# Patient Record
Sex: MALE | Race: WHITE | Employment: OTHER | URBAN - METROPOLITAN AREA
[De-identification: names, ages, dates, MRNs, and addresses within clinical notes are randomized per-mention and may not be internally consistent; named-entity substitution may affect disease eponyms.]

---

## 2017-02-02 ENCOUNTER — TELEPHONE (OUTPATIENT)
Dept: FAMILY MEDICINE | Facility: CLINIC | Age: 72
End: 2017-02-02

## 2017-02-02 NOTE — Clinical Note
February 2, 2017      Tommie Kendrick  [address] [place] [id number]        Dear Mr. Tommie Kendrick,    Our records show that you are currently due for screening for colon cancer. If you would like to complete a colonoscopy please call [phone]. If you would like to complete the stool sample option (FIT Test) please call our clinic at [phone] and we would be happy to assist you.    If you have already had this completed please contact our clinic so we can update your chart.     If you have further questions or concerns please feel free to contact us via SupportSpace or by calling our clinic at [phone].    Thank you,  Jaime Crews [facility]

## 2017-02-10 DIAGNOSIS — I63.81 ACUTE THALAMIC INFARCTION (H): ICD-10-CM

## 2017-02-10 DIAGNOSIS — Z00.00 ENCOUNTER FOR ROUTINE ADULT HEALTH EXAMINATION WITHOUT ABNORMAL FINDINGS: ICD-10-CM

## 2017-02-10 LAB
ALBUMIN SERPL-MCNC: 3.7 G/DL (ref 3.4–5)
ALP SERPL-CCNC: 88 U/L (ref 40–150)
ALT SERPL W P-5'-P-CCNC: 34 U/L (ref 0–70)
ANION GAP SERPL CALCULATED.3IONS-SCNC: 5 MMOL/L (ref 3–14)
AST SERPL W P-5'-P-CCNC: 26 U/L (ref 0–45)
BILIRUB SERPL-MCNC: 1.6 MG/DL (ref 0.2–1.3)
BUN SERPL-MCNC: 15 MG/DL (ref 7–30)
CALCIUM SERPL-MCNC: 8.7 MG/DL (ref 8.5–10.1)
CHLORIDE SERPL-SCNC: 105 MMOL/L (ref 94–109)
CHOLEST SERPL-MCNC: 104 MG/DL
CO2 SERPL-SCNC: 30 MMOL/L (ref 20–32)
CREAT SERPL-MCNC: 1.11 MG/DL (ref 0.66–1.25)
GFR SERPL CREATININE-BSD FRML MDRD: 65 ML/MIN/1.7M2
GLUCOSE SERPL-MCNC: 102 MG/DL (ref 70–99)
HDLC SERPL-MCNC: 44 MG/DL
LDLC SERPL CALC-MCNC: 42 MG/DL
NONHDLC SERPL-MCNC: 60 MG/DL
POTASSIUM SERPL-SCNC: 4.3 MMOL/L (ref 3.4–5.3)
PROT SERPL-MCNC: 6.7 G/DL (ref 6.8–8.8)
SODIUM SERPL-SCNC: 140 MMOL/L (ref 133–144)
TRIGL SERPL-MCNC: 92 MG/DL

## 2017-02-10 PROCEDURE — 36415 COLL VENOUS BLD VENIPUNCTURE: CPT | Performed by: PHYSICIAN ASSISTANT

## 2017-02-10 PROCEDURE — 80053 COMPREHEN METABOLIC PANEL: CPT | Performed by: PHYSICIAN ASSISTANT

## 2017-02-10 PROCEDURE — 80061 LIPID PANEL: CPT | Performed by: PHYSICIAN ASSISTANT

## 2017-05-25 ENCOUNTER — TELEPHONE (OUTPATIENT)
Dept: FAMILY MEDICINE | Facility: CLINIC | Age: 72
End: 2017-05-25

## 2017-05-25 NOTE — TELEPHONE ENCOUNTER
Panel Management Review      Patient has the following on his problem list: None      Composite cancer screening  Chart review shows that this patient is due/due soon for the following Colonoscopy  Summary:    Patient is due/failing the following:   COLONOSCOPY    Action needed:   Patient needs office visit for Colonoscopy.    Type of outreach:    Phone, left message for patient to call back.     Questions for provider review:    None                                                                                                                                    SUBJECTIVE: Jaime Crews [facility]       Chart routed to Care Team .

## 2017-06-08 DIAGNOSIS — I63.81 ACUTE THALAMIC INFARCTION (H): ICD-10-CM

## 2017-06-08 NOTE — TELEPHONE ENCOUNTER
atorvastatin (LIPITOR) 40 MG     Last Written Prescription Date: 12/15/16  Last Fill Quantity: 90, # refills: 0  Last Office Visit with G, P or [facility] prescribing provider: 12/15/16       Lab Results   Component Value Date    CHOL 104 02/10/2017     Lab Results   Component Value Date    HDL 44 02/10/2017     Lab Results   Component Value Date    LDL 42 02/10/2017     Lab Results   Component Value Date    TRIG 92 02/10/2017     Lab Results   Component Value Date    CHOLHDLRATIO 3.9 05/12/2015

## 2017-06-12 RX ORDER — ATORVASTATIN CALCIUM 40 MG/1
TABLET, FILM COATED ORAL
Qty: 90 TABLET | Refills: 0 | Status: SHIPPED | OUTPATIENT
Start: 2017-06-12 | End: 2017-12-13

## 2017-06-12 NOTE — TELEPHONE ENCOUNTER
Prescription approved per [facility] Refill Protocol.    Masha Rachel RN

## 2017-07-25 ENCOUNTER — DOCUMENTATION ONLY (OUTPATIENT)
Dept: FAMILY MEDICINE | Facility: CLINIC | Age: 72
End: 2017-07-25

## 2017-07-25 NOTE — PROGRESS NOTES
Panel Management Review      Patient has the following on his problem list: None      Composite cancer screening  Chart review shows that this patient is due/due soon for the following Colonoscopy  Summary:    Patient is due/failing the following:   COLONOSCOPY    Action needed:   Patient needs office visit for Colonoscopy.    Type of outreach:    Phone, left message for patient to call back.     Questions for provider review:    None                                                                                                                                    Jaime Crews [facility]       Chart routed to Care Team .

## 2017-12-13 DIAGNOSIS — I63.81 ACUTE THALAMIC INFARCTION (H): ICD-10-CM

## 2017-12-14 RX ORDER — ATORVASTATIN CALCIUM 40 MG/1
TABLET, FILM COATED ORAL
Qty: 30 TABLET | Refills: 0 | Status: SHIPPED | OUTPATIENT
Start: 2017-12-14 | End: 2018-03-01

## 2017-12-14 NOTE — TELEPHONE ENCOUNTER
Pt due for office visit. Called to schedule. No answer, Left message advising callback.     2 month supply issued (30 tabs)    Valentina Martini RN -- [facility]

## 2017-12-14 NOTE — TELEPHONE ENCOUNTER
Requested Prescriptions   Pending Prescriptions Disp Refills     atorvastatin (LIPITOR) 40 MG tablet [Pharmacy Med Name: ATORVASTATIN 40 MG TABLET] 90 tablet 0     Sig: TAKE 0.5 TABLETS (20 MG) BY MOUTH DAILY    Statins Protocol Passed    12/13/2017  8:15 AM       Passed - LDL on file in past 12 months    Recent Labs   Lab Test  02/10/17   0810   LDL  42            Passed - No abnormal creatine kinase in past 12 months    No lab results found.         Passed - Recent or future visit with authorizing provider    Patient had office visit in the last year or has a visit in the next 30 days with authorizing provider.  See chart review.              Passed - Patient is age 18 or older

## 2017-12-29 ENCOUNTER — TELEPHONE (OUTPATIENT)
Dept: FAMILY MEDICINE | Facility: CLINIC | Age: 72
End: 2017-12-29

## 2017-12-29 NOTE — TELEPHONE ENCOUNTER
"12/29/2017        Patient Communication Preferences indicate  Do not contact  and/or communication by \"Phone\" is not preferred. Call not required per Outreach team.          Outreach ,  Yisel Barrios     "

## 2018-03-01 DIAGNOSIS — I63.81 ACUTE THALAMIC INFARCTION (H): ICD-10-CM

## 2018-03-01 RX ORDER — ATORVASTATIN CALCIUM 40 MG/1
TABLET, FILM COATED ORAL
Qty: 30 TABLET | Refills: 0 | Status: SHIPPED | OUTPATIENT
Start: 2018-03-01 | End: 2018-03-26

## 2018-03-01 NOTE — TELEPHONE ENCOUNTER
"Requested Prescriptions   Pending Prescriptions Disp Refills     atorvastatin (LIPITOR) 40 MG tablet [Pharmacy Med Name: ATORVASTATIN 40 MG TABLET]  Last Written Prescription Date:  12/14/17  Last Fill Quantity: 30,  # refills: 0   Last office visit: 12/15/2016 with prescribing provider:  12/15/2016     Future Office Visit:   Next 5 appointments (look out 90 days)     Mar 26, 2018  9:40 AM CDT   PHYSICAL with Christiano Resendiz PA-C   [address] [address] [phone]                  30 tablet 0     Sig: TAKE 0.5 TABLETS (20 MG) BY MOUTH DAILY    Statins Protocol Failed    3/1/2018 11:18 AM       Failed - LDL on file in past 12 months    Recent Labs   Lab Test  02/10/17   0810   LDL  42            Passed - No abnormal creatine kinase in past 12 months    No lab results found.         Passed - Recent or future visit with authorizing provider    Patient had office visit in the last year or has a visit in the next 30 days with authorizing provider.  See \"Patient Info\" tab in inbasket, or \"Choose Columns\" in Meds & Orders section of the refill encounter.            Passed - Patient is age 18 or older          "

## 2018-03-01 NOTE — TELEPHONE ENCOUNTER
Patient is coming in this month for a physical and labs.   Refilled x 1 month.  Stephanie Lui, RN  Triage Nurse

## 2018-03-26 ENCOUNTER — OFFICE VISIT (OUTPATIENT)
Dept: FAMILY MEDICINE | Facility: CLINIC | Age: 73
End: 2018-03-26
Payer: COMMERCIAL

## 2018-03-26 VITALS
HEIGHT: 66 IN | SYSTOLIC BLOOD PRESSURE: 139 MMHG | WEIGHT: 147.7 LBS | DIASTOLIC BLOOD PRESSURE: 69 MMHG | HEART RATE: 67 BPM | TEMPERATURE: 97.3 F | RESPIRATION RATE: 18 BRPM | OXYGEN SATURATION: 98 % | BODY MASS INDEX: 23.74 KG/M2

## 2018-03-26 DIAGNOSIS — I63.9 CEREBRAL INFARCTION, UNSPECIFIED MECHANISM (H): ICD-10-CM

## 2018-03-26 DIAGNOSIS — Z00.00 ENCOUNTER FOR ROUTINE ADULT HEALTH EXAMINATION WITHOUT ABNORMAL FINDINGS: Primary | ICD-10-CM

## 2018-03-26 DIAGNOSIS — E78.5 HYPERLIPIDEMIA LDL GOAL <70: ICD-10-CM

## 2018-03-26 DIAGNOSIS — I63.81 ACUTE THALAMIC INFARCTION (H): ICD-10-CM

## 2018-03-26 DIAGNOSIS — Z12.11 SCREEN FOR COLON CANCER: ICD-10-CM

## 2018-03-26 PROCEDURE — 36415 COLL VENOUS BLD VENIPUNCTURE: CPT | Performed by: PHYSICIAN ASSISTANT

## 2018-03-26 PROCEDURE — 80048 BASIC METABOLIC PNL TOTAL CA: CPT | Performed by: PHYSICIAN ASSISTANT

## 2018-03-26 PROCEDURE — 80061 LIPID PANEL: CPT | Performed by: PHYSICIAN ASSISTANT

## 2018-03-26 PROCEDURE — G0439 PPPS, SUBSEQ VISIT: HCPCS | Performed by: PHYSICIAN ASSISTANT

## 2018-03-26 RX ORDER — ATORVASTATIN CALCIUM 40 MG/1
20 TABLET, FILM COATED ORAL DAILY
Qty: 90 TABLET | Refills: 1 | Status: SHIPPED | OUTPATIENT
Start: 2018-03-26 | End: 2019-04-10

## 2018-03-26 ASSESSMENT — ENCOUNTER SYMPTOMS
FREQUENCY: 0
EYE PAIN: 0
ABDOMINAL PAIN: 0
CHILLS: 0
FEVER: 0
CONSTIPATION: 1
DIZZINESS: 0
HEMATURIA: 0
COUGH: 0
NERVOUS/ANXIOUS: 0
HEMATOCHEZIA: 0
DIARRHEA: 1

## 2018-03-26 ASSESSMENT — ACTIVITIES OF DAILY LIVING (ADL)
I_NEED_ASSISTANCE_FOR_THE_FOLLOWING_DAILY_ACTIVITIES:: NO ASSISTANCE IS NEEDED
CURRENT_FUNCTION: NO ASSISTANCE NEEDED

## 2018-03-26 NOTE — LETTER
March 27, 2018      Tommie Kendrick  [address] [facility] [address]        León Reilly,    Great seeing you again this week!  Attached are copies of your labs. Overall they look pretty good.    Screening of the kidneys, electrolytes and for diabetes looked within the expected ranges.  The sugar is right on that borderline but has been stable there for quite some time.  Continue the excellent focus on exercise.    Your cholesterol remains well controlled on the medication.    For now no changes are needed which is good. Please remember to get that colonoscopy done and consider the new shingles vaccine!  Let me know any questions,     Randy Resendiz

## 2018-03-26 NOTE — MR AVS SNAPSHOT
After Visit Summary   3/26/2018    Tommie Kendrick    MRN: [id number]           Patient Information     Date Of Birth          1945        Visit Information        Provider Department      3/26/2018 9:40 AM Christian[facility] Res[facility]iz [facility]        Today's Diagnoses     Encounter for routine adult health examination without abnormal findings    -  1    Screen for colon cancer        Hyperlipidemia LDL goal <70        Cerebral infarction, unspecified mechanism (H)        Acute thalamic infarction (H)          Care Instructions        Preventive Health Recommendations:       Male Ages 65 and over    Yearly exam:             See your health care provider every year in order to  o   Review health changes.   o   Discuss preventive care.    o   Review your medicines if your doctor has prescribed any.    Talk with your health care provider about whether you should have a test to screen for prostate cancer (PSA).    Every 3 years, have a diabetes test (fasting glucose). If you are at risk for diabetes, you should have this test more often.    Every 5 years, have a cholesterol test. Have this test more often if you are at risk for high cholesterol or heart disease.     Every 10 years, have a colonoscopy. Or, have a yearly FIT test (stool test). These exams will check for colon cancer.    Talk to with your health care provider about screening for Abdominal Aortic Aneurysm if you have a family history of AAA or have a history of smoking.  Shots:     Get a flu shot each year.     Get a tetanus shot every 10 years.     Talk to your doctor about your pneumonia vaccines. There are now two you should receive - Pneumovax (PPSV 23) and Prevnar (PCV 13).    Talk to your doctor about a shingles vaccine.     Talk to your doctor about the hepatitis B vaccine.  Nutrition:     Eat at least 5 servings of fruits and vegetables each day.     Eat whole-grain bread, whole-wheat pasta and brown rice  instead of white grains and rice.     Talk to your doctor about Calcium and Vitamin D.   Lifestyle    Exercise for at least 150 minutes a week (30 minutes a day, 5 days a week). This will help you control your weight and prevent disease.     Limit alcohol to one drink per day.     No smoking.     Wear sunscreen to prevent skin cancer.     See your dentist every six months for an exam and cleaning.     See your eye doctor every 1 to 2 years to screen for conditions such as glaucoma, macular degeneration and cataracts.          Follow-ups after your visit        Additional Services     GASTROENTEROLOGY ADULT REF PROCEDURE ONLY       Last Lab Result: Creatinine (mg/dL)       Date                     Value                 02/10/2017               1.11             ----------  Body mass index is 24.02 kg/(m^2).     Needed:  No  Language:  English    Patient will be contacted to schedule procedure.     Please be aware that coverage of these services is subject to the terms and limitations of your health insurance plan.  Call member services at your health plan with any benefit or coverage questions.  Any procedures must be performed at a [facility] facility OR coordinated by your clinic's referral office.    Please bring the following with you to your appointment:    (1) Any X-Rays, CTs or MRIs which have been performed.  Contact the facility where they were done to arrange for  prior to your scheduled appointment.    (2) List of current medications   (3) This referral request   (4) Any documents/labs given to you for this referral                  Who to contact     If you have questions or need follow up information about today's clinic visit or your schedule please contact [facility] directly at [phone].  Normal or non-critical lab and imaging results will be communicated to you by MyChart, letter or phone within 4 business days after the clinic has received the results. If you do  "not hear from us within 7 days, please contact the clinic through [employer] or phone. If you have a critical or abnormal lab result, we will notify you by phone as soon as possible.  Submit refill requests through [employer] or call your pharmacy and they will forward the refill request to us. Please allow 3 business days for your refill to be completed.          Additional Information About Your Visit        [employer] Information     [employer] lets you send messages to your doctor, view your test results, renew your prescriptions, schedule appointments and more. To sign up, go to [email or web address] . Click on \"Log in\" on the left side of the screen, which will take you to the Welcome page. Then click on \"Sign up Now\" on the right side of the page.     You will be asked to enter the access code listed below, as well as some personal information. Please follow the directions to create your username and password.     Your access code is: [email or web address]  Expires: 2018 10:24 AM     Your access code will  in 90 days. If you need help or a new code, please call your [facility] clinic or [phone].        Care EveryWhere ID     This is your Care EveryWhere ID. This could be used by other organizations to access your [facility] medical records  [id number]        Your Vitals Were     Pulse Temperature Respirations Height Pulse Oximetry BMI (Body Mass Index)    67 97.3  F (36.3  C) (Tympanic) 18 5' 5.75\" (1.67 m) 98% 24.02 kg/m2       Blood Pressure from Last 3 Encounters:   18 139/69   12/15/16 130/74   06/22/15 131/67    Weight from Last 3 Encounters:   18 147 lb 11.2 oz (67 kg)   12/15/16 153 lb 1.6 oz (69.4 kg)   06/22/15 158 lb (71.7 kg)              We Performed the Following     Basic metabolic panel     GASTROENTEROLOGY ADULT REF PROCEDURE ONLY     Lipid panel reflex to direct LDL Fasting          Today's Medication Changes          These changes are accurate as of 3/26/18 10:24 AM.  If you have any " questions, ask your nurse or doctor.               These medicines have changed or have updated prescriptions.        Dose/Directions    atorvastatin 40 MG tablet   Commonly known as:  LIPITOR   This may have changed:  See the new instructions.   Used for:  Acute thalamic infarction (H)   Changed by:  Christiano Resendiz PA-C        Dose:  20 mg   Take 0.5 tablets (20 mg) by mouth daily   Quantity:  90 tablet   Refills:  1            Where to get your medicines      These medications were sent to [place] [id number] [place] [facility] - [address] [facility] [address] [facility] [address]     Phone:  [phone]     atorvastatin 40 MG tablet                Primary Care Provider Office Phone # Fax #    Christiano Resendiz PA-C [phone] [address]  [facility] [address]        Equal Access to Services     [facility] : Hadii zoe ku hadasho Soomaali, waaxda luqadaha, qaybta kaalmada adeegyada, waxay seb reveles . So [facility] [phone].    ATENCIÓN: Si habla español, tiene a mooney disposición servicios gratuitos de asistencia lingüística. [facility] [phone].    We comply with applicable federal civil rights laws and [place] laws. We do not discriminate on the basis of race, color, national origin, age, disability, sex, sexual orientation, or gender identity.            Thank you!     Thank you for choosing [facility]  for your care. Our goal is always to provide you with excellent care. Hearing back from our patients is one way we can continue to improve our services. Please take a few minutes to complete the written survey that you may receive in the mail after your visit with us. Thank you!             Your Updated Medication List - Protect others around you: Learn how to safely use, store and throw away your medicines at www.disposemymeds.org.          This list is accurate as of 3/26/18 10:24 AM.  Always use your most recent med  list.                   Brand Name Dispense Instructions for use Diagnosis    ADVIL PO      Take 400 mg by mouth every 8 hours as needed.        atorvastatin 40 MG tablet    LIPITOR    90 tablet    Take 0.5 tablets (20 mg) by mouth daily    Acute thalamic infarction (H)       CALCIUM 1200+D3 600- MG-MG-UNIT Tb24   Generic drug:  Calcium-Magnesium-Vitamin D           VITAMIN D (CHOLECALCIFEROL) PO      Take 4,000 Units by mouth daily

## 2018-03-26 NOTE — PROGRESS NOTES
SUBJECTIVE:   Tommie Kendrick is a 72 year old male who presents for Preventive Visit.  Are you in the first 12 months of your Medicare coverage?  No    Physical   Annual:     Getting at least 3 servings of Calcium per day::  Yes    Bi-annual eye exam::  NO    Dental care twice a year::  NO    Sleep apnea or symptoms of sleep apnea::  None    Diet::  Regular (no restrictions)    Frequency of exercise::  1 day/week    Duration of exercise::  15-30 minutes    Taking medications regularly::  Yes    Medication side effects::  None    Additional concerns today::  YES (left shoulder )    Ability to successfully perform activities of daily living: no assistance needed  Home Safety:  No safety concerns identified  Hearing Impairment: no hearing concerns      Fall risk:  Fallen 2 or more times in the past year?: Yes  Any fall with injury in the past year?: No  COGNITIVE SCREEN  1) Repeat 3 items (Banana, Sunrise, Chair)    2) Clock draw: NORMAL  3) 3 item recall: Recalls 3 objects  Results: 3 items recalled: COGNITIVE IMPAIRMENT LESS LIKELY    Mini-CogTM Copyright TIANA Berry. Licensed by the author for use in [facility]; reprinted with permission (soob@[facility]). All rights reserved.        Reviewed and updated as needed this visit by clinical staff         Reviewed and updated as needed this visit by Provider        Social History   Substance Use Topics     Smoking status: Former Smoker     Years: 8.00     Quit date: 10/28/1980     Smokeless tobacco: Not on file      Comment: 2 ppweek     Alcohol use 0.0 - 0.5 oz/week     0 - 1 Cans of beer per week      Comment: once a week, 2 drinks       Alcohol Use 3/26/2018   If you drink alcohol do you typically have greater than 3 drinks per day OR greater than 7 drinks per week? No     Today's PHQ-2 Score:   PHQ-2 ( 1999 Pfizer) 3/26/2018   Q1: Little interest or pleasure in doing things 0   Q2: Feeling down, depressed or hopeless 0   PHQ-2 Score 0   Q1: Little interest  or pleasure in doing things Not at all   Q2: Feeling down, depressed or hopeless Not at all   PHQ-2 Score 0       Do you feel safe in your environment - Yes    Do you have a Health Care Directive?: Yes: Patient states has Advance Directive and will bring in a copy to clinic.    Current providers sharing in care for this patient include:   Patient Care Team:  Christiano Resendiz PA-C as PCP - General (Physician Assistant)    The following health maintenance items are reviewed in Epic and correct as of today:  Health Maintenance   Topic Date Due     ADVANCE DIRECTIVE PLANNING Q5 YRS  07/20/1999     COLON CANCER SCREEN (SYSTEM ASSIGNED)  06/03/2014     FALL RISK ASSESSMENT  12/15/2017     INFLUENZA VACCINE (SYSTEM ASSIGNED)  09/01/2018     LIPID SCREEN Q5 YR MALE (SYSTEM ASSIGNED)  02/10/2022     TETANUS IMMUNIZATION (SYSTEM ASSIGNED)  12/15/2026     PNEUMOCOCCAL  Completed     AORTIC ANEURYSM SCREENING (SYSTEM ASSIGNED)  Completed     Labs reviewed in EPIC    Pneumonia Vaccine: Up to date    Review of Systems   Constitutional: Negative for chills and fever.   HENT: Negative for congestion and ear pain.    Eyes: Negative for pain.   Respiratory: Negative for cough.    Cardiovascular: Negative for chest pain.   Gastrointestinal: Positive for constipation (periodic, not problematic) and diarrhea (periodic, not problematic). Negative for abdominal pain and hematochezia.   Genitourinary: Negative for frequency, genital sores and hematuria.   Musculoskeletal: Arthralgias: left shoulder pain, limited ROM.   Neurological: Negative for dizziness.   Psychiatric/Behavioral: The patient is not nervous/anxious.      Patient also notes some left shoulder ache/pain and limited ROM  He denies any specific trauma; occurred after watching his grandkids one day  Initially had difficulty moving past 90 degrees and laying on the arm   -ROM steadily improving, difficulty laying on it still  No n/t   He is limited with lifting heavier  "items, but no other limitation  He would like to wait on working this up - will recheck in 6 months      OBJECTIVE:   /69 (BP Location: Right arm, Patient Position: Chair, Cuff Size: Adult Regular)  Pulse 67  Temp 97.3  F (36.3  C) (Tympanic)  Resp 18  Ht 5' 5.75\" (1.67 m)  Wt 147 lb 11.2 oz (67 kg)  SpO2 98%  BMI 24.02 kg/m2 Estimated body mass index is 24.52 kg/(m^2) as calculated from the following:    Height as of 12/15/16: 5' 6.25\" (1.683 m).    Weight as of 12/15/16: 153 lb 1.6 oz (69.4 kg).  Physical Exam  GENERAL: healthy, alert and no distress  EYES: Eyes grossly normal to inspection, PERRL and conjunctivae and sclerae normal  HENT: ear canals and TM's normal, nose and mouth without ulcers or lesions  NECK: no adenopathy, no asymmetry, masses, or scars and thyroid normal to palpation  RESP: lungs clear to auscultation - no rales, rhonchi or wheezes  CV: regular rate and rhythm, normal S1 S2, no S3 or S4, no murmur, click or rub, no peripheral edema and peripheral pulses strong  ABDOMEN: soft, nontender, no hepatosplenomegaly, no masses and bowel sounds normal  MS: left shoulder shows limitation with action above 90 but without gross weakness. Internal and external rotation wnl.   SKIN: no suspicious lesions or rashes  NEURO: Normal strength and tone, mentation intact and speech normal    ASSESSMENT / PLAN:   1. Encounter for routine adult health examination without abnormal findings  - Basic metabolic panel    2. Screen for colon cancer  Reviewed again today. As I stated previously, I think screening is appropriate in this patient given his health, life expectancy and willingness to treat if anything found  - GASTROENTEROLOGY ADULT REF PROCEDURE ONLY    3. Hyperlipidemia LDL goal <70  Tolerates statin well. Active. Monitoring diet.   - Lipid panel reflex to direct LDL Fasting    4. Cerebral infarction, unspecified mechanism (H)  5. Acute thalamic infarction (H)  No further symptoms. Continues " "healthy diet and quite active.   - Basic metabolic panel  - atorvastatin (LIPITOR) 40 MG tablet; Take 0.5 tablets (20 mg) by mouth daily  Dispense: 90 tablet; Refill: 1    End of Life Planning:  Patient currently has an advanced directive: Yes.  Practitioner is supportive of decision.    COUNSELING:  Reviewed preventive health counseling, as reflected in patient instructions       Regular exercise       Healthy diet/nutrition       Colon cancer screening       Prostate cancer screening    BP Screening:   Last 3 BP Readings:    BP Readings from Last 3 Encounters:   03/26/18 139/69   12/15/16 130/74   06/22/15 131/67       The following was recommended to the patient:  Re-screen BP within a year and recommended lifestyle modifications    Estimated body mass index is 24.52 kg/(m^2) as calculated from the following:    Height as of 12/15/16: 5' 6.25\" (1.683 m).    Weight as of 12/15/16: 153 lb 1.6 oz (69.4 kg).     reports that he quit smoking about 37 years ago. He quit after 8.00 years of use. He does not have any smokeless tobacco history on file.      Appropriate preventive services were discussed with this patient, including applicable screening as appropriate for cardiovascular disease, diabetes, osteopenia/osteoporosis, and glaucoma.  As appropriate for age/gender, discussed screening for colorectal cancer, prostate cancer, breast cancer, and cervical cancer. Checklist reviewing preventive services available has been given to the patient.    Reviewed patients plan of care and provided an AVS. The Basic Care Plan (routine screening as documented in Health Maintenance) for Tommie meets the Care Plan requirement. This Care Plan has been established and reviewed with the Patient.    Counseling Resources:  ATP IV Guidelines  Pooled Cohorts Equation Calculator  Breast Cancer Risk Calculator  FRAX Risk Assessment  ICSI Preventive Guidelines  Dietary Guidelines for Americans, 2010  USDA's MyPlate  ASA Prophylaxis  Lung " CA Screening    Christiano Resendiz PA-C  [facility]  Answers for HPI/ROS submitted by the patient on 3/26/2018   PHQ-2 Score: 0

## 2018-03-26 NOTE — PATIENT INSTRUCTIONS
Preventive Health Recommendations:       Male Ages 65 and over    Yearly exam:             See your health care provider every year in order to  o   Review health changes.   o   Discuss preventive care.    o   Review your medicines if your doctor has prescribed any.    Talk with your health care provider about whether you should have a test to screen for prostate cancer (PSA).    Every 3 years, have a diabetes test (fasting glucose). If you are at risk for diabetes, you should have this test more often.    Every 5 years, have a cholesterol test. Have this test more often if you are at risk for high cholesterol or heart disease.     Every 10 years, have a colonoscopy. Or, have a yearly FIT test (stool test). These exams will check for colon cancer.    Talk to with your health care provider about screening for Abdominal Aortic Aneurysm if you have a family history of AAA or have a history of smoking.  Shots:     Get a flu shot each year.     Get a tetanus shot every 10 years.     Talk to your doctor about your pneumonia vaccines. There are now two you should receive - Pneumovax (PPSV 23) and Prevnar (PCV 13).    Talk to your doctor about a shingles vaccine.     Talk to your doctor about the hepatitis B vaccine.  Nutrition:     Eat at least 5 servings of fruits and vegetables each day.     Eat whole-grain bread, whole-wheat pasta and brown rice instead of white grains and rice.     Talk to your doctor about Calcium and Vitamin D.   Lifestyle    Exercise for at least 150 minutes a week (30 minutes a day, 5 days a week). This will help you control your weight and prevent disease.     Limit alcohol to one drink per day.     No smoking.     Wear sunscreen to prevent skin cancer.     See your dentist every six months for an exam and cleaning.     See your eye doctor every 1 to 2 years to screen for conditions such as glaucoma, macular degeneration and cataracts.

## 2018-03-27 LAB
ANION GAP SERPL CALCULATED.3IONS-SCNC: 4 MMOL/L (ref 3–14)
BUN SERPL-MCNC: 17 MG/DL (ref 7–30)
CALCIUM SERPL-MCNC: 8.8 MG/DL (ref 8.5–10.1)
CHLORIDE SERPL-SCNC: 106 MMOL/L (ref 94–109)
CHOLEST SERPL-MCNC: 112 MG/DL
CO2 SERPL-SCNC: 29 MMOL/L (ref 20–32)
CREAT SERPL-MCNC: 1.16 MG/DL (ref 0.66–1.25)
GFR SERPL CREATININE-BSD FRML MDRD: 62 ML/MIN/1.7M2
GLUCOSE SERPL-MCNC: 100 MG/DL (ref 70–99)
HDLC SERPL-MCNC: 50 MG/DL
LDLC SERPL CALC-MCNC: 43 MG/DL
NONHDLC SERPL-MCNC: 62 MG/DL
POTASSIUM SERPL-SCNC: 4.8 MMOL/L (ref 3.4–5.3)
SODIUM SERPL-SCNC: 139 MMOL/L (ref 133–144)
TRIGL SERPL-MCNC: 93 MG/DL

## 2018-04-12 ENCOUNTER — TELEPHONE (OUTPATIENT)
Dept: FAMILY MEDICINE | Facility: CLINIC | Age: 73
End: 2018-04-12

## 2018-04-12 ENCOUNTER — HOSPITAL ENCOUNTER (OUTPATIENT)
Facility: CLINIC | Age: 73
Discharge: HOME | End: 2018-04-12
Attending: INTERNAL MEDICINE | Admitting: INTERNAL MEDICINE
Payer: COMMERCIAL

## 2018-04-12 VITALS
DIASTOLIC BLOOD PRESSURE: 75 MMHG | SYSTOLIC BLOOD PRESSURE: 117 MMHG | OXYGEN SATURATION: 95 % | RESPIRATION RATE: 15 BRPM

## 2018-04-12 LAB — COLONOSCOPY: NORMAL

## 2018-04-12 PROCEDURE — G0500 MOD SEDAT ENDO SERVICE >5YRS: HCPCS | Performed by: INTERNAL MEDICINE

## 2018-04-12 PROCEDURE — 45378 DIAGNOSTIC COLONOSCOPY: CPT | Performed by: INTERNAL MEDICINE

## 2018-04-12 PROCEDURE — G0121 COLON CA SCRN NOT HI RSK IND: HCPCS | Performed by: INTERNAL MEDICINE

## 2018-04-12 PROCEDURE — 25000128 H RX IP 250 OP 636: Performed by: INTERNAL MEDICINE

## 2018-04-12 RX ORDER — ONDANSETRON 2 MG/ML
4 INJECTION INTRAMUSCULAR; INTRAVENOUS
Status: DISCONTINUED | OUTPATIENT
Start: 2018-04-12 | End: 2018-04-12 | Stop reason: HOSPADM

## 2018-04-12 RX ORDER — FLUMAZENIL 0.1 MG/ML
0.2 INJECTION, SOLUTION INTRAVENOUS
Status: DISCONTINUED | OUTPATIENT
Start: 2018-04-12 | End: 2018-04-12 | Stop reason: HOSPADM

## 2018-04-12 RX ORDER — LIDOCAINE 40 MG/G
CREAM TOPICAL
Status: DISCONTINUED | OUTPATIENT
Start: 2018-04-12 | End: 2018-04-12 | Stop reason: HOSPADM

## 2018-04-12 RX ORDER — NALOXONE HYDROCHLORIDE 0.4 MG/ML
.1-.4 INJECTION, SOLUTION INTRAMUSCULAR; INTRAVENOUS; SUBCUTANEOUS
Status: DISCONTINUED | OUTPATIENT
Start: 2018-04-12 | End: 2018-04-12 | Stop reason: HOSPADM

## 2018-04-12 RX ORDER — ONDANSETRON 2 MG/ML
4 INJECTION INTRAMUSCULAR; INTRAVENOUS EVERY 6 HOURS PRN
Status: DISCONTINUED | OUTPATIENT
Start: 2018-04-12 | End: 2018-04-12 | Stop reason: HOSPADM

## 2018-04-12 RX ORDER — ONDANSETRON 4 MG/1
4 TABLET, ORALLY DISINTEGRATING ORAL EVERY 6 HOURS PRN
Status: DISCONTINUED | OUTPATIENT
Start: 2018-04-12 | End: 2018-04-12 | Stop reason: HOSPADM

## 2018-04-12 RX ORDER — FENTANYL CITRATE 50 UG/ML
INJECTION, SOLUTION INTRAMUSCULAR; INTRAVENOUS PRN
Status: DISCONTINUED | OUTPATIENT
Start: 2018-04-12 | End: 2018-04-12 | Stop reason: HOSPADM

## 2018-04-12 NOTE — H&P
Pre-Endoscopy History and Physical     Tommie Kendrick MRN# [id number]   YOB: 1945 Age: 72 year old     Date of Procedure: 4/12/2018  Primary care provider: Christiano Resendiz  Type of Endoscopy: Colonoscopy with possible biopsy, possible polypectomy  Reason for Procedure: screen  Type of Anesthesia Anticipated: Conscious Sedation    HPI:    Tommie is a 72 year old male who will be undergoing the above procedure.      A history and physical has been performed. The patient's medications and allergies have been reviewed. The risks and benefits of the procedure and the sedation options and risks were discussed with the patient.  All questions were answered and informed consent was obtained.      He denies a personal or family history of anesthesia complications or bleeding disorders.     Patient Active Problem List   Diagnosis     Degeneration of lumbar or lumbosacral intervertebral disc     Thoracic or lumbosacral neuritis or radiculitis, unspecified     ASD (atrial septal defect)     Choledocholithiasis     S/P laparoscopic cholecystectomy     Degenerative spondylolisthesis     Cerebral infarction (H)     Cerebral infarction due to unspecified mechanism     Hyperlipidemia LDL goal <70        Past Medical History:   Diagnosis Date     ASD (atrial septal defect), sinus venosus defect      Stroke (H)     thalamic        Past Surgical History:   Procedure Laterality Date     C NONSPECIFIC PROCEDURE  1997    right inguinal hernia repair      C REPR ASD W BYPASS  2004     COLONOSCOPY  3/04    good     ENDOSCOPIC RETROGRADE CHOLANGIOPANCREATOGRAM  12/17/2012    Procedure: COMBINED ENDOSCOPIC RETROGRADE CHOLANGIOPANCREATOGRAPHY, SPHINCTEROTOMY;  ercp;  Surgeon: Keri Sandoval MD;  Location:  GI     ENDOSCOPIC RETROGRADE CHOLANGIOPANCREATOGRAM  12/17/2012    Procedure: COMBINED ENDOSCOPIC RETROGRADE CHOLANGIOPANCREATOGRAPHY, REMOVE STONE/BALLOON;;  Surgeon: Keri Sandoval MD;   "Location: [facility]     HC PRESSURE GRADIENT MEASUREMENT, INITIAL VESSEL      good     HC REMOVAL OF TONSILS,<11 Y/O      Tonsils <11y.o.     LAPAROSCOPIC CHOLECYSTECTOMY WITH CHOLANGIOGRAMS  2012    Procedure: LAPAROSCOPIC CHOLECYSTECTOMY WITH CHOLANGIOGRAMS;  Laparoscopic cholecystectomy with grams;  Surgeon: Sharda Reilly DO;  Location:  OR       Social History   Substance Use Topics     Smoking status: Former Smoker     Years: 8.00     Quit date: 10/28/1980     Smokeless tobacco: Never Used      Comment: 2 ppweek     Alcohol use 0.0 - 0.5 oz/week     0 - 1 Cans of beer per week      Comment: occa       Family History   Problem Relation Age of Onset     Alzheimer Disease Mother       at 72yoa     DIABETES Father      DIABETES Paternal Grandmother      CANCER Maternal Aunt       of unknown type of cancer     C.A.D. No family hx of      Hypertension No family hx of      CEREBROVASCULAR DISEASE No family hx of      Colon Cancer No family hx of        Prior to Admission medications    Medication Sig Start Date End Date Taking? Authorizing Provider   atorvastatin (LIPITOR) 40 MG tablet Take 0.5 tablets (20 mg) by mouth daily 3/26/18  Yes Christiano Resendiz PA-C   Calcium-Magnesium-Vitamin D (CALCIUM 1200+D3) 600- MG-MG-UNIT TB24  6/12/15  Yes Cierra Irwin MD   Ibuprofen (ADVIL PO) Take 400 mg by mouth every 8 hours as needed.   Yes Unknown, Entered By History   VITAMIN D, CHOLECALCIFEROL, PO Take 4,000 Units by mouth daily    Reported, Patient       Allergies   Allergen Reactions     Penicillins Swelling     SWELLING OF ARM        REVIEW OF SYSTEMS:   5 point ROS negative except as noted above in HPI, including Gen., Resp., CV, GI &  system review.    PHYSICAL EXAM:   /75  Resp 14  SpO2 97% Estimated body mass index is 24.02 kg/(m^2) as calculated from the following:    Height as of 3/26/18: 1.67 m (5' 5.75\").    Weight as of 3/26/18: 67 kg (147 lb 11.2 oz).   GENERAL " APPEARANCE: alert, and oriented  MENTAL STATUS: alert  AIRWAY EXAM: Mallampatti Class I (visualization of the soft palate, fauces, uvula, anterior and posterior pillars)  RESP: lungs clear to auscultation - no rales, rhonchi or wheezes  CV: regular rates and rhythm  DIAGNOSTICS:    Not indicated    IMPRESSION   ASA Class 2 - Mild systemic disease    PLAN:   Plan for Colonoscopy with possible biopsy, possible polypectomy. We discussed the risks, benefits and alternatives and the patient wished to proceed.    The above has been forwarded to the consulting provider.      Signed Electronically by: Gregory Santos  April 12, 2018

## 2018-04-12 NOTE — LETTER
April 3, 2018      Tommie Kendrick  [address] [facility] [address]        Dear Tommie,         Thank you for choosing [facility]. You are scheduled for the following service.     Date:  April 12, 2018 - Thursday             Procedure:  COLONOSCOPY  Doctor:        Gregory Santos   Arrival Time:  11:30 am  *check in at Emergency/Endoscopy desk*  Procedure Time:  12:00 pm    Location:   [facility]        Endoscopy Department, First Floor (Enter through ER Doors) *        [address] [place], [place] [address]      [phone] or [phone] () to reschedule      MIRALAX -GATORADE  PREP  Colonoscopy is the most accurate test to detect colon polyps and colon cancer; and the only test where polyps can be removed. During this procedure, a doctor examines the lining of your large intestine and rectum through a flexible tube.           Transportation  Arrange for a ride for the day of your procedure with a responsible adult.  A taxi ride is not an option unless you are accompanied by a responsible adult. If you fail to arrange transportation with a responsible adult, your procedure will be cancelled and rescheduled.    Purchase the  following supplies at your local pharmacy:  - 2 (two) bisacodyl tablets: each tablet contains 5 mg.  (Dulcolax  laxative NOT Dulcolax  stool softener)   - 1 (one) 8.3 oz bottle of Polyethylene Glycol (PEG) 3350 Powder   (MiraLAX , Smooth LAX , ClearLAX  or equivalent)  - 64 oz Gatorade    Regular Gatorade, Gatorade G2 , Powerade , Powerade Zero  or Pedialyte  is acceptable. Red colored flavors are not allowed; all other colors (yellow, green, orange, purple and blue) are okay. It is also okay to buy two 2.12 oz packets of powdered Gatorade that can be mixed with water to a total volume of 64 oz of liquid.  - 1 (one) 10 oz bottle of Magnesium Citrate (Red colored flavors are not allowed)  It is also okay for you to use  a 0.5 oz package of powdered magnesium citrate (17 g) mixed with 10 oz of water.    PREPARATION FOR COLONOSCOPY    7 days before:    Discontinue fiber supplements and medications containing iron. This includes Metamucil  and Fibercon ; and multivitamins with iron.  3 days before:    Begin a low-fiber diet. A low-fiber diet helps making the cleanout more effective.     Examples of a low-fiber diet include (but are not limited to): white bread, white rice, pasta, crackers, fish, chicken, eggs, ground beef, creamy peanut butter, cooked/steamed/boiled vegetables, canned fruit, bananas, melons, milk, plain yogurt cheese, salad dressing and other condiments.     The following are not allowed on a low-fiber diet: seeds, nuts, popcorn, bran, whole wheat, corn, quinoa, raw fruits and vegetables, berries and dried fruit, beans and lentils.    For additional details on low-fiber diet, please refer to the table on the last page.  2 days before:    Continue the low-fiber diet.     Drink at least 8 glasses of water throughout the day.     Stop eating solid foods at 11:45 pm.  1 day before:    In the morning: begin a clear liquid diet (liquids you can see through).     Examples of a clear liquid diet include: water, clear broth or bouillon, Gatorade, Pedialyte or Powerade, carbonated and non-carbonated soft drinks (Sprite , 7-Up , ginger ale), strained fruit juices without pulp (apple, white grape, white cranberry), Jell-O  and popsicles.     The following are not allowed on a clear liquid diet: red liquids, alcoholic beverages, coffee, dairy products (milk, creamer, and yogurt), protein shakes, creamy broths, juice with pulp and chewing tobacco.    At noon: take 2 (two) bisacodyl tablets     At 4 (and no later than 6pm): start drinking the Miralax-Gatorade preparation (8.3 oz of Miralax mixed with 64 oz of Gatorade in a large pitcher). Drink 1(one) 8 oz glass every 15 minutes thereafter, until the mixture is gone.    COLON  CLEANSING TIPS: drink adequate amounts of fluids before and after your colon cleansing to prevent dehydration. Stay near a toilet because you will have diarrhea. Even if you are sitting on the toilet, continue to drink the cleansing solution every 15 minutes. If you feel nauseous or vomit, rinse your mouth with water, take a 15 to 30-minute-break and then continue drinking the solution. You will be uncomfortable until the stool has flushed from your colon (in about 2 to 4 hours). You may feel chilled.              Day of your procedure  You may take all of your morning medications including blood pressure medications, blood thinners (if you have not been instructed to stop these by our office), methadone, anti-seizure medications with sips of water 3 hours prior to your procedure or earlier. Do not take insulin or vitamins prior to your procedure. Continue the clear liquid diet.   4 hours prior: drink 10 oz of magnesium citrate. It may be easier to drink it with a straw.    STOP consuming all liquids after that.     Do not take anything by mouth during this time.     Allow extra time to travel to your procedure as you may need to stop and use a restroom along the way.  You are ready for the procedure, if you followed all instructions and your stool is no longer formed, but clear or yellow liquid. If you are unsure whether your colon is clean, please call our office at [phone] before you leave for your appointment.  Bring the following to your procedure:  - Insurance Card/Photo ID.   - List of current medications including over-the-counter medications and supplements.   - Your rescue inhaler if you currently use one to control asthma.      Canceling or rescheduling your appointment:   If you must cancel or reschedule your appointment, please call [phone] as soon as possible.      COLONOSCOPY PRE-PROCEDURE CHECKLIST  If you have diabetes, ask your regular doctor for diet and medication restrictions.  If you  take an anticoagulant or anti-platelet medication (such as Coumadin , Lovenox , Pradaxa , Xarelto , Eliquis , etc.), please call your primary doctor for advice on holding this medication.  If you take aspirin you may continue to do so.  If you are or may be pregnant, please discuss the risks and benefits of this procedure with your doctor.          What happens during a colonoscopy?    Plan to spend up to two hours, starting at registration time, at the endoscopy center the day of your procedure. The colonoscopy takes an average of 15 to 30 minutes. Recovery time is about 30 minutes.    Before the exam:    You will change into a gown.    Your medical history and medication list will be reviewed with you, unless that has been done over the phone prior to the procedure.     A nurse will insert an intravenous (IV) line into your hand or arm.    The doctor will meet with you and will give you a consent form to sign.    During the exam:     Medicine will be given through the IV line to help you relax.     Your heart rate and oxygen levels will be monitored. If your blood pressure is low, you may be given fluids through the IV line.     The doctor will insert a flexible hollow tube, called a colonoscope, into your rectum. The scope will be advanced slowly through the large intestine (colon).    You may have a feeling of fullness or pressure.     If an abnormal tissue or a polyp is found, the doctor may remove it through the endoscope for closer examination, or biopsy. Tissue removal is painless    After the exam:           Any tissue samples removed during the exam will be sent to a lab for evaluation. It may take 5-7 working days for you to be notified of the results.     A nurse will provide you with complete discharge instructions before you leave the endoscopy center. Be sure to ask the nurse for specific instructions if you take blood thinners such as Aspirin, Coumadin or Plavix.     The doctor will prepare a full  report for you and for the physician who referred you for the procedure.     Your doctor will talk with you about the initial results of your exam.      Medication given during the exam will prohibit you from driving for the rest of the day.     Following the exam, you may resume your normal diet. Your first meal should be light, no greasy foods. Avoid alcohol until the next day.     You may resume your regular activities the day after the procedure.     LOW-FIBER DIET    Foods RECOMMENDED Foods to AVOID   Breads, Cereal, Rice and Pasta:   White bread, rolls, biscuits, croissant and dedra toast.   Waffles, Luxembourger toast and pancakes.   White rice, noodles, pasta, macaroni and peeled cooked potatoes.   Plain crackers and saltines.   Cooked cereals: farina, cream of rice.   Cold cereals: Puffed Rice , Rice Krispies , Corn Flakes  and Special K    Breads, Cereal, Rice and Pasta:   Breads or rolls with nuts, seeds or fruit.   Whole wheat, pumpernickel, rye breads and cornbread.   Potatoes with skin, brown or wild rice, and kasha (buckwheat).     Vegetables:   Tender cooked and canned vegetables without seeds: carrots, asparagus tips, green or wax beans, pumpkin, spinach, lima beans. Vegetables:   Raw or steamed vegetables.   Vegetables with seeds.   Sauerkraut.   Winter squash, peas, broccoli, Brussel sprouts, cabbage, onions, cauliflower, baked beans, peas and corn.   Fruits:   Strained fruit juice.   Canned fruit, except pineapple.   Ripe bananas and melon. Fruits:   Prunes and prune juice.   Raw fruits.   Dried fruits: figs, dates and raisins.   Milk/Dairy:   Milk: plain or flavored.   Yogurt, custard and ice cream.   Cheese and cottage cheese Milk/Dairy:     Meat and other proteins:   ground, well-cooked tender beef, lamb, ham, veal, pork, fish, poultry and organ meats.   Eggs.   Peanut butter without nuts. Meat and other proteins:   Tough, fibrous meats with gristle.   Dry beans, peas and lentils.   Peanut butter  with nuts.   Tofu.   Fats, Snack, Sweets, Condiments and Beverages:   Margarine, butter, oils, mayonnaise, sour cream and salad dressing, plain gravy.   Sugar, hard candy, clear jelly, honey and syrup.   Spices, cooked herbs, bouillon, broth and soups made with allowed vegetable, ketchup and mustard.   Coffee, tea and carbonated drinks.   Plain cakes, cookies and pretzels.   Gelatin, plain puddings, custard, ice cream, sherbet and popsicles. Fats, Snack, Sweets, Condiments and Beverages:   Nuts, seeds and coconut.   Jam, marmalade and preserves.   Pickles, olives, relish and horseradish.   All desserts containing nuts, seeds, dried fruit and coconut; or made from whole grains or bran.   Candy made with nuts or seeds.   Popcorn.                     DIRECTIONS TO THE ENDOSCOPY DEPARTMENT     From the north ([facility])  Take [address] South, exit on [address] [address]. Get into the left hand teodora, turn left (east), go one-half mile to [address] and turn left. Go north to the first stoplight, take a right on [address] and follow it to the Emergency entrance.    From the south ([facility])  Take 35N to the [address]E split and exit on [address] [address]. On [address] [address], turn left (west) to [address]. Turn right (north) on [address]. Go north to the first stoplight, take a right on [address] and follow it to the Emergency entrance.    From the east via 35E ([facility])  Take [address]E south to [address] [address] exit. Turn right on [address] [address]. Go west to [address]. Turn right (north) on [address]. Go to the first stoplight, take a right and follow on [address] to the Emergency entrance.    From the east via [address] ([facility])  Take [address] to [address]. Turn left (south) on [address] to [address]. Turn left (east) on [address] and follow it to the Emergency entrance.    From the west via [address] ([place])  Take [address] east to [address]. Turn right (south) on [address] to [address]. Turn left (east) on [address] and follow it to the Emergency entrance.

## 2018-04-12 NOTE — TELEPHONE ENCOUNTER
Patient informed pharmacy does have prescription, but cannot fill until the 18th.    Patient states that is fine.    Lin Daniel RN

## 2018-04-12 NOTE — TELEPHONE ENCOUNTER
Patient states that [facility] did not receive Rx for atorvastatin (LIPITOR) 40 MG tablet.      Please resend Rx to pharmacy.     Thank you.

## 2018-04-12 NOTE — DISCHARGE INSTRUCTIONS
The patient has received a copy of the Provation  report the doctor has written and discharge instructions have been discussed with the patient and responsible adult.  All questions were addressed and answered prior to patient discharge.

## 2018-08-20 ENCOUNTER — OFFICE VISIT (OUTPATIENT)
Dept: FAMILY MEDICINE | Facility: CLINIC | Age: 73
End: 2018-08-20
Payer: COMMERCIAL

## 2018-08-20 VITALS
BODY MASS INDEX: 22.7 KG/M2 | RESPIRATION RATE: 14 BRPM | HEIGHT: 67 IN | WEIGHT: 144.6 LBS | DIASTOLIC BLOOD PRESSURE: 72 MMHG | SYSTOLIC BLOOD PRESSURE: 144 MMHG | HEART RATE: 67 BPM | OXYGEN SATURATION: 97 % | TEMPERATURE: 97.8 F

## 2018-08-20 DIAGNOSIS — I63.9 CEREBRAL INFARCTION, UNSPECIFIED MECHANISM (H): ICD-10-CM

## 2018-08-20 DIAGNOSIS — R25.1 TREMOR: ICD-10-CM

## 2018-08-20 DIAGNOSIS — H53.9 VISION CHANGES: ICD-10-CM

## 2018-08-20 DIAGNOSIS — R03.0 ELEVATED BLOOD PRESSURE READING IN OFFICE WITHOUT DIAGNOSIS OF HYPERTENSION: ICD-10-CM

## 2018-08-20 DIAGNOSIS — N50.89 TESTICULAR NODULE: Primary | ICD-10-CM

## 2018-08-20 LAB
ALBUMIN UR-MCNC: NEGATIVE MG/DL
APPEARANCE UR: CLEAR
BILIRUB UR QL STRIP: NEGATIVE
COLOR UR AUTO: YELLOW
GLUCOSE UR STRIP-MCNC: NEGATIVE MG/DL
HGB UR QL STRIP: NEGATIVE
KETONES UR STRIP-MCNC: NEGATIVE MG/DL
LEUKOCYTE ESTERASE UR QL STRIP: NEGATIVE
NITRATE UR QL: NEGATIVE
PH UR STRIP: 5.5 PH (ref 5–7)
SOURCE: NORMAL
SP GR UR STRIP: 1.02 (ref 1–1.03)
UROBILINOGEN UR STRIP-ACNC: 0.2 EU/DL (ref 0.2–1)

## 2018-08-20 PROCEDURE — 99214 OFFICE O/P EST MOD 30 MIN: CPT | Performed by: PHYSICIAN ASSISTANT

## 2018-08-20 PROCEDURE — 81003 URINALYSIS AUTO W/O SCOPE: CPT | Performed by: PHYSICIAN ASSISTANT

## 2018-08-20 NOTE — MR AVS SNAPSHOT
After Visit Summary   8/20/2018    Tommie Kendrick    MRN: [id number]           Patient Information     Date Of Birth          1945        Visit Information        Provider Department      8/20/2018 9:00 AM Christiano Resendiz PA-C [facility]        Today's Diagnoses     Testicular nodule    -  1    Cerebral infarction, unspecified mechanism (H)        Vision changes        Elevated blood pressure reading in office without diagnosis of hypertension        Tremor          Care Instructions    Please return to our clinic for either a nurse visit (need an appt) or pharmacy (no appt needed) to check the blood pressure within the next week. If still elevated we may need to start a medication to control this given your history.    I would like you to schedule a visit with an eye doctor.  Then we may consider imaging of the brain or even a follow up visit with your neurologist    We will get some urine testing today if you can - otherwise you can come back for that  We will have you schedule an ultrasound at [facility] to check on the nodule    You can call [phone] to schedule your imaging at [facility].             Follow-ups after your visit        Follow-up notes from your care team     Return in about 4 weeks (around 9/17/2018) for BP Recheck.      Future tests that were ordered for you today     Open Future Orders        Priority Expected Expires Ordered    US Testicular and Scrotum Routine  8/20/2019 8/20/2018    MR Brain w/o & w Contrast Routine  8/20/2019 8/20/2018            Who to contact     If you have questions or need follow up information about today's clinic visit or your schedule please contact RONALDO ORELLANA directly at [phone].  Normal or non-critical lab and imaging results will be communicated to you by MyChart, letter or phone within 4 business days after the clinic has received the results. If you do not hear from us within 7 days,  "please contact the clinic through [employer] or phone. If you have a critical or abnormal lab result, we will notify you by phone as soon as possible.  Submit refill requests through [employer] or call your pharmacy and they will forward the refill request to us. Please allow 3 business days for your refill to be completed.          Additional Information About Your Visit        Care EveryWhere ID     This is your Care EveryWhere ID. This could be used by other organizations to access your [facility] medical records  [id number]        Your Vitals Were     Pulse Temperature Respirations Height Pulse Oximetry BMI (Body Mass Index)    67 97.8  F (36.6  C) (Tympanic) 14 5' 6.5\" (1.689 m) 97% 22.99 kg/m2       Blood Pressure from Last 3 Encounters:   08/20/18 144/72   04/12/18 117/75   03/26/18 139/69    Weight from Last 3 Encounters:   08/20/18 144 lb 9.6 oz (65.6 kg)   03/26/18 147 lb 11.2 oz (67 kg)   12/15/16 153 lb 1.6 oz (69.4 kg)              We Performed the Following     *UA reflex to Microscopic and Culture ([place] and [facility] [place] (except [place] and [place])        Primary Care Provider Office Phone # Fax #    Christiano Resendiz PA-C [phone] [address] [facility] [address]        Equal Access to Services     WALTER VENTURA : Hadii aad ku hadasho Soomaali, waaxda luqadaha, qaybta kaalmada lorayada, ila reveles . So [facility] [phone].    ATENCIÓN: Si habla español, tiene a mooney disposición servicios gratuitos de asistencia lingüística. Reg al [phone].    We comply with applicable federal civil rights laws and [place] laws. We do not discriminate on the basis of race, color, national origin, age, disability, sex, sexual orientation, or gender identity.            Thank you!     Thank you for choosing [facility]  for your care. Our goal is always to provide you with excellent care. Hearing back from our patients is one way we " can continue to improve our services. Please take a few minutes to complete the written survey that you may receive in the mail after your visit with us. Thank you!             Your Updated Medication List - Protect others around you: Learn how to safely use, store and throw away your medicines at www.disposemymeds.org.          This list is accurate as of 8/20/18 10:04 AM.  Always use your most recent med list.                   Brand Name Dispense Instructions for use Diagnosis    ADVIL PO      Take 400 mg by mouth every 8 hours as needed.        atorvastatin 40 MG tablet    LIPITOR    90 tablet    Take 0.5 tablets (20 mg) by mouth daily    Acute thalamic infarction (H)       CALCIUM 1200+D3 600- MG-MG-UNIT Tb24   Generic drug:  Calcium-Magnesium-Vitamin D           VITAMIN D (CHOLECALCIFEROL) PO      Take 4,000 Units by mouth daily

## 2018-08-20 NOTE — PATIENT INSTRUCTIONS
Please return to our clinic for either a nurse visit (need an appt) or pharmacy (no appt needed) to check the blood pressure within the next week. If still elevated we may need to start a medication to control this given your history.    I would like you to schedule a visit with an eye doctor.  Then we may consider imaging of the brain or even a follow up visit with your neurologist    We will get some urine testing today if you can - otherwise you can come back for that  We will have you schedule an ultrasound at [facility] to check on the nodule    You can call [phone] to schedule your imaging at [facility].

## 2018-08-20 NOTE — PROGRESS NOTES
"  SUBJECTIVE:   Tommie Kendrick is a 73 year old male who presents to clinic today for the following health issues:    Growth       Duration: Noticed within the last week    Description (location/character/radiation): Patient has a growth on right side of groin area, right above testicle.     Intensity:  moderate    Accompanying signs and symptoms: Had some blurry vision last week, none since. Burning pain in groin area when squeezed     History (similar episodes/previous evaluation): Mild stroke in the past     Precipitating or alleviating factors: None    Therapies tried and outcome: None     -Patient presents after noting a new testicular (right) nodule around 1 week ago  -He first noted this after experiencing some throbbing pain in the groin, like he had been kicked in the groin  -He notes the nodule is \"aobe the testicle\" and tender to squeeze  -pain is almost a burning  -pain will come and go   -he denies any changes to urination  -no blood in the urine    -he also notes that around one week ago he noted that his vision changed for a brief moment  -\"it went really wacky\"  -the lower aspect of his vision seemed wavy, not the upper aspect  -occurred with both eyes  -seemed to last around 15 minutes  -denies any dizziness  -no body changes or weakness    Problem list and histories reviewed & adjusted, as indicated.  Additional history: as documented    Patient Active Problem List   Diagnosis     Degeneration of lumbar or lumbosacral intervertebral disc     Thoracic or lumbosacral neuritis or radiculitis, unspecified     ASD (atrial septal defect)     Choledocholithiasis     S/P laparoscopic cholecystectomy     Degenerative spondylolisthesis     Cerebral infarction (H)     Cerebral infarction due to unspecified mechanism     Hyperlipidemia LDL goal <70     Past Surgical History:   Procedure Laterality Date     C NONSPECIFIC PROCEDURE  1997    right inguinal hernia repair      C REPR ASD W BYPASS  2004     " "COLONOSCOPY  3/04    good     COLONOSCOPY N/A 2018    Procedure: COLONOSCOPY;  Colonoscopy;  Surgeon: Gregory Santos MD;  Location:  GI     ENDOSCOPIC RETROGRADE CHOLANGIOPANCREATOGRAM  2012    Procedure: COMBINED ENDOSCOPIC RETROGRADE CHOLANGIOPANCREATOGRAPHY, SPHINCTEROTOMY;  ercp;  Surgeon: Keri Sandoval MD;  Location:  GI     ENDOSCOPIC RETROGRADE CHOLANGIOPANCREATOGRAM  2012    Procedure: COMBINED ENDOSCOPIC RETROGRADE CHOLANGIOPANCREATOGRAPHY, REMOVE STONE/BALLOON;;  Surgeon: Keri Sandoval MD;  Location:  GI     HC PRESSURE GRADIENT MEASUREMENT, INITIAL VESSEL      good     HC REMOVAL OF TONSILS,<11 Y/O  1949    Tonsils <11y.o.     LAPAROSCOPIC CHOLECYSTECTOMY WITH CHOLANGIOGRAMS  2012    Procedure: LAPAROSCOPIC CHOLECYSTECTOMY WITH CHOLANGIOGRAMS;  Laparoscopic cholecystectomy with grams;  Surgeon: Sharda Reilly DO;  Location:  OR       Social History   Substance Use Topics     Smoking status: Former Smoker     Years: 8.00     Quit date: 10/28/1980     Smokeless tobacco: Never Used      Comment: 2 ppweek     Alcohol use 0.0 - 0.5 oz/week     0 - 1 Cans of beer per week      Comment: occa     Family History   Problem Relation Age of Onset     Alzheimer Disease Mother       at 72yoa     Diabetes Father      Diabetes Paternal Grandmother      Cancer Maternal Aunt       of unknown type of cancer     C.A.D. No family hx of      Hypertension No family hx of      Cerebrovascular Disease No family hx of      Colon Cancer No family hx of            Reviewed and updated as needed this visit by clinical staff       Reviewed and updated as needed this visit by Provider         ROS:  Constitutional, HEENT, cardiovascular, pulmonary, gi and gu systems are negative, except as otherwise noted.    OBJECTIVE:     /72  Pulse 67  Temp 97.8  F (36.6  C) (Tympanic)  Resp 14  Ht 5' 6.5\" (1.689 m)  Wt 144 lb 9.6 oz (65.6 kg)  SpO2 97%  BMI 22.99 " kg/m2  Body mass index is 22.99 kg/(m^2).  GENERAL: healthy, alert and no distress  EYES: Eyes grossly normal to inspection, PERRL and conjunctivae and sclerae normal  NECK: no carotid bruits  RESP: lungs clear to auscultation - no rales, rhonchi or wheezes  CV: regular rates and rhythm   (male): there is no herniation present. Over the superior, posterior pole of the right testicle is a tender, mobile nodule  NEURO: Normal strength and tone, sensory exam grossly normal, mentation intact, oriented times 3, speech normal, cranial nerves 2-12 intact and gait normal including heel/toe/tandem walking; on finger to nose testing this patient is noted to have intentioned, high frequency tremor; with EOM testing there does appear to be a small head tremor as well  PSYCH: mentation appears normal and affect normal/bright    Diagnostic Test Results:  Results for orders placed or performed in visit on 08/20/18   *UA reflex to Microscopic and Culture ([place] [facility] (except [place] and [place])   Result Value Ref Range    Color Urine Yellow     Appearance Urine Clear     Glucose Urine Negative NEG^Negative mg/dL    Bilirubin Urine Negative NEG^Negative    Ketones Urine Negative NEG^Negative mg/dL    Specific Gravity Urine 1.025 1.003 - 1.035    Blood Urine Negative NEG^Negative    pH Urine 5.5 5.0 - 7.0 pH    Protein Albumin Urine Negative NEG^Negative mg/dL    Urobilinogen Urine 0.2 0.2 - 1.0 EU/dL    Nitrite Urine Negative NEG^Negative    Leukocyte Esterase Urine Negative NEG^Negative    Source Midstream Urine        ASSESSMENT/PLAN:   1. Testicular nodule  Will update with ultrasound. No evidence for hernia. Consider epididymal csyt  - *UA reflex to Microscopic and Culture ([place] [facility] (except [place] and [place])    2. Cerebral infarction, unspecified mechanism (H)  He has been symptoms free for some time. Has historically resisted anticoagulation because of abnormal taste sensation but  "given the symptoms below this is something he may have to tolerate to reduce risk.   - MR Brain w/o & w Contrast; Future    3. Vision changes  This was not a binocular loss but a patterned \"change\" along the inferior visual field. It resolved in 15 minutes without recurrence. He has normal carotid sounds. He had no other neurologic manifestation. I'll have him obtain an eye exam and then we may pursue brain imaging and ultimately follow up with neurology. RTC/Call immediately with any recurrence.  - MR Brain w/o & w Contrast; Future    4. Elevated blood pressure reading in office without diagnosis of hypertension  This is new. Consider pain as cause. Historically these have been quite good. We'll monitor closely.     5. Tremor  This is new. He denies knowledge of this previously but his wife thinks she may have noted this previously. There is NOT a noted resting tremor. We'll consider neurology especially given the above.       Christiano Resendiz PA-C  [facility] [place]  "

## 2018-08-21 ENCOUNTER — HOSPITAL ENCOUNTER (OUTPATIENT)
Dept: ULTRASOUND IMAGING | Facility: CLINIC | Age: 73
Discharge: HOME | End: 2018-08-21
Attending: PHYSICIAN ASSISTANT | Admitting: PHYSICIAN ASSISTANT
Payer: COMMERCIAL

## 2018-08-21 DIAGNOSIS — N50.89 TESTICULAR NODULE: ICD-10-CM

## 2018-08-21 PROCEDURE — 93976 VASCULAR STUDY: CPT

## 2019-03-18 ENCOUNTER — VIRTUAL VISIT (OUTPATIENT)
Dept: FAMILY MEDICINE | Facility: CLINIC | Age: 74
End: 2019-03-18
Payer: COMMERCIAL

## 2019-03-18 DIAGNOSIS — M54.50 ACUTE MIDLINE LOW BACK PAIN WITHOUT SCIATICA: Primary | ICD-10-CM

## 2019-03-18 PROCEDURE — 99441 ZZC PHYSICIAN TELEPHONE EVALUATION 5-10 MIN: CPT | Performed by: PHYSICIAN ASSISTANT

## 2019-03-18 RX ORDER — CYCLOBENZAPRINE HCL 10 MG
10 TABLET ORAL AT BEDTIME
Qty: 10 TABLET | Refills: 0 | Status: SHIPPED | OUTPATIENT
Start: 2019-03-18 | End: 2019-08-20

## 2019-04-01 ENCOUNTER — TRANSFERRED RECORDS (OUTPATIENT)
Dept: HEALTH INFORMATION MANAGEMENT | Facility: CLINIC | Age: 74
End: 2019-04-01

## 2019-04-10 DIAGNOSIS — I63.81 ACUTE THALAMIC INFARCTION (H): ICD-10-CM

## 2019-04-11 RX ORDER — ATORVASTATIN CALCIUM 40 MG/1
20 TABLET, FILM COATED ORAL DAILY
Qty: 15 TABLET | Refills: 0 | Status: SHIPPED | OUTPATIENT
Start: 2019-04-11 | End: 2019-05-07

## 2019-04-11 NOTE — TELEPHONE ENCOUNTER
"Requested Prescriptions   Pending Prescriptions Disp Refills     atorvastatin (LIPITOR) 40 MG tablet [Pharmacy Med Name: ATORVASTATIN 40 MG TABLET]  Last Written Prescription Date:  3/26/18  Last Fill Quantity: 90,  # refills: 1    Last office visit: 3/18/2019 with prescribing provider:  Christiano Resendiz PA-C        Future Office Visit:     90 tablet 1     Sig: TAKE 0.5 TABLETS (20 MG) BY MOUTH DAILY       Statins Protocol Failed - 4/10/2019  1:37 AM        Failed - LDL on file in past 12 months     Recent Labs   Lab Test 03/26/18  1026   LDL 43             Passed - No abnormal creatine kinase in past 12 months     No lab results found.             Passed - Recent (12 mo) or future (30 days) visit within the authorizing provider's specialty     Patient had office visit in the last 12 months or has a visit in the next 30 days with authorizing provider or within the authorizing provider's specialty.  See \"Patient Info\" tab in inbasket, or \"Choose Columns\" in Meds & Orders section of the refill encounter.              Passed - Medication is active on med list        Passed - Patient is age 18 or older          "

## 2019-04-11 NOTE — TELEPHONE ENCOUNTER
Medication is being filled for 1 time refill only due to:  Pt due for office visit and lab work.     Dominique Perrin RN

## 2019-05-06 ENCOUNTER — TRANSFERRED RECORDS (OUTPATIENT)
Dept: HEALTH INFORMATION MANAGEMENT | Facility: CLINIC | Age: 74
End: 2019-05-06

## 2019-05-06 NOTE — PROGRESS NOTES
"  SUBJECTIVE:   Tommie Kendrick is a 73 year old male who presents to clinic today for the following   health issues:    Back Pain       Duration: 3 months         Specific cause: slipped on ice in Winter     Description:   Location of pain: low back left  Character of pain: sharp  Pain radiation: sometimes into pelvis area   New numbness or weakness in legs, not attributed to pain:  no     Intensity: Currently 7/10, At its worst 10/10    History:   Pain interferes with job: Not applicable  History of back problems: history of back problems   Any previous MRI or X-rays: Yes--at [facility] . Date 5/6/19  Sees a specialist for back pain: Saw [facility] for 3 months in 2015  Therapies tried without relief: Aspirin, PT    Alleviating factors:   Improved by: None      Precipitating factors:  Worsened by: Nothing      Accompanying Signs & Symptoms:  Risk of Fracture:  Age >64 and fall  Risk of Cauda Equina:  None  Risk of Infection:  None  Risk of Cancer:  None  Risk of Ankylosing Spondylitis:  Onset at age <35, male, AND morning back stiffness. no     Tommie presents today to discuss his ongoing back pain  He has a chronic hx of back problems but recently injured his back from a slip on ice and fall in March  He did a phone visit against advice to be seen and did a short trial of muscle relaxant  He has been seeing [facility]  Doing lots of physical therapy   Feeling like the symptoms persist, in fact worsening  He is noting difficulty doing daily activities, eating less because of pain  Difficulty standing on the toes of the left leg  There is low back pain left, and glute; and will warp into the groin  Pain will increase with sitting; needs to sleep by \"leaning into the pain\"  There is no change in BMs;; no change to urination    He has been using a lot of daily Advil, with moderate improvement    Note from Dr. Salomón alonzo CT Scan Results:  The CT scan report was reviewed. It was " compared to a scan on 6/19/15. The current study revealed interval development of a soft tissue mass within and medial to the left L1-2 neural foramen, likely a large extruded disc fragment. Tendon sheath tumor could not be extruded and MRI exam with and without contrast was offered for further evaluation if clinically indicated. There was also interval development of at least mild bilateral foraminal stenosis at L4-5 . Otherwise the moderate severe central stenosis at L4-5, and L-4 as well as mild central stenosis at L2-3 were unchanged. Multilevel foraminal stenosis was most priment on the right at L3-4 and left at L5-S1 and unchanged since the prior exam. There was no acute bony abnormality.        Additional history: as documented    Reviewed  and updated as needed this visit by clinical staff         Reviewed and updated as needed this visit by Provider         Patient Active Problem List   Diagnosis     Degeneration of lumbar or lumbosacral intervertebral disc     Thoracic or lumbosacral neuritis or radiculitis, unspecified     ASD (atrial septal defect)     Choledocholithiasis     S/P laparoscopic cholecystectomy     Degenerative spondylolisthesis     Cerebral infarction (H)     Cerebral infarction due to unspecified mechanism     Hyperlipidemia LDL goal <70     Past Surgical History:   Procedure Laterality Date     C NONSPECIFIC PROCEDURE  1997    right inguinal hernia repair      C REPR ASD W BYPASS  2004     COLONOSCOPY  3/04    good     COLONOSCOPY N/A 4/12/2018    Procedure: COLONOSCOPY;  Colonoscopy;  Surgeon: Gregory Santos MD;  Location:  GI     ENDOSCOPIC RETROGRADE CHOLANGIOPANCREATOGRAM  12/17/2012    Procedure: COMBINED ENDOSCOPIC RETROGRADE CHOLANGIOPANCREATOGRAPHY, SPHINCTEROTOMY;  ercp;  Surgeon: Keri Sandoval MD;  Location: [facility]     ENDOSCOPIC RETROGRADE CHOLANGIOPANCREATOGRAM  12/17/2012    Procedure: COMBINED ENDOSCOPIC RETROGRADE CHOLANGIOPANCREATOGRAPHY, REMOVE  "STONE/BALLOON;;  Surgeon: Keri Sandoval MD;  Location: [facility]     HC PRESSURE GRADIENT MEASUREMENT, INITIAL VESSEL      good     HC REMOVAL OF TONSILS,<12 Y/O  7    Tonsils <11y.o.     LAPAROSCOPIC CHOLECYSTECTOMY WITH CHOLANGIOGRAMS  2012    Procedure: LAPAROSCOPIC CHOLECYSTECTOMY WITH CHOLANGIOGRAMS;  Laparoscopic cholecystectomy with grams;  Surgeon: Sharda Reilly DO;  Location: [facility] OR       Social History     Tobacco Use     Smoking status: Former Smoker     Years: 8.00     Last attempt to quit: 10/28/1980     Years since quittin.5     Smokeless tobacco: Never Used     Tobacco comment: 2 ppweek   Substance Use Topics     Alcohol use: Yes     Alcohol/week: 0.0 - 0.5 oz     Comment: occa     Family History   Problem Relation Age of Onset     Alzheimer Disease Mother          at 72yoa     Diabetes Father      Diabetes Paternal Grandmother      Cancer Maternal Aunt          of unknown type of cancer     C.A.D. No family hx of      Hypertension No family hx of      Cerebrovascular Disease No family hx of      Colon Cancer No family hx of            ROS:  Constitutional, HEENT, cardiovascular, pulmonary, gi and gu systems are negative, except as otherwise noted.    OBJECTIVE:     BP (!) 152/94   Pulse 77   Temp 97.6  F (36.4  C) (Tympanic)   Resp 16   Ht 1.676 m (5' 6\")   Wt 67.2 kg (148 lb 3.2 oz)   SpO2 98%   BMI 23.92 kg/m    Body mass index is 23.92 kg/m .  GENERAL: healthy, alert and no distress  RESP: lungs clear to auscultation - no rales, rhonchi or wheezes  CV: regular rates and rhythm  MS: there is decreased active ROM in the left extremity 2/2 pain. Ambulation favors the left. I did feel great toe flexion/extension were wnl. Achilles reflex likely diminished. Some diminished dorsiflexion.  BACK: there is ttp along the low lumbar paraspinal left. Increased with rotation and lateral flexion. SLR positive left sided    Diagnostic Test Results:  none - reviewed CT " write up per Dr. Lorenzana [facility]    ASSESSMENT/PLAN:   1. Left-sided low back pain with left-sided sciatica, unspecified chronicity  Persistent symptoms since March. He has done extensive physical therapy without improvement and in fact worsening he notes. CT scan did show possible tumor but it is likely a disc extrusion. He did get an MRI yesterday, without results. We need to address two separate things. First his pain currently - he's been using a lot of ibuprofen and I dont like this concomitant use with asa. Will trial gabapentin and a small amount of opioid for immediate relief prn. Secondly we'll need to see what the MRI show and likely consider additional therapy/intervention. To follow up per results  - gabapentin (NEURONTIN) 100 MG capsule; Take 1 capsule (100 mg) by mouth At Bedtime Increase by 1 cap every 2 nights as tolerated to 3 total (300mg)  Dispense: 30 capsule; Refill: 0  - HYDROcodone-acetaminophen (NORCO) 5-325 MG tablet; Take 1 tablet by mouth every 6 hours as needed for pain  Dispense: 5 tablet; Refill: 0    2. Elevated blood pressure reading without diagnosis of hypertension  I suspect 2/2 pain, nsaids. Monitor close especially with hx of CVA.       Christiano Resendiz PA-C  [facility]

## 2019-05-07 ENCOUNTER — OFFICE VISIT (OUTPATIENT)
Dept: FAMILY MEDICINE | Facility: CLINIC | Age: 74
End: 2019-05-07
Payer: COMMERCIAL

## 2019-05-07 VITALS
RESPIRATION RATE: 16 BRPM | TEMPERATURE: 97.6 F | WEIGHT: 148.2 LBS | OXYGEN SATURATION: 98 % | BODY MASS INDEX: 23.82 KG/M2 | HEART RATE: 77 BPM | HEIGHT: 66 IN | SYSTOLIC BLOOD PRESSURE: 152 MMHG | DIASTOLIC BLOOD PRESSURE: 94 MMHG

## 2019-05-07 DIAGNOSIS — R03.0 ELEVATED BLOOD PRESSURE READING WITHOUT DIAGNOSIS OF HYPERTENSION: ICD-10-CM

## 2019-05-07 DIAGNOSIS — I63.81 ACUTE THALAMIC INFARCTION (H): ICD-10-CM

## 2019-05-07 DIAGNOSIS — M54.42 LEFT-SIDED LOW BACK PAIN WITH LEFT-SIDED SCIATICA, UNSPECIFIED CHRONICITY: Primary | ICD-10-CM

## 2019-05-07 PROCEDURE — 99214 OFFICE O/P EST MOD 30 MIN: CPT | Performed by: PHYSICIAN ASSISTANT

## 2019-05-07 RX ORDER — HYDROCODONE BITARTRATE AND ACETAMINOPHEN 5; 325 MG/1; MG/1
1 TABLET ORAL EVERY 6 HOURS PRN
Qty: 5 TABLET | Refills: 0 | Status: SHIPPED | OUTPATIENT
Start: 2019-05-07 | End: 2019-05-10

## 2019-05-07 RX ORDER — GABAPENTIN 100 MG/1
100 CAPSULE ORAL AT BEDTIME
Qty: 30 CAPSULE | Refills: 0 | Status: SHIPPED | OUTPATIENT
Start: 2019-05-07 | End: 2019-05-13

## 2019-05-07 ASSESSMENT — MIFFLIN-ST. JEOR: SCORE: 1354.98

## 2019-05-07 NOTE — PATIENT INSTRUCTIONS
Please let me know about the results of the MRI; The reports are BEST  The best case plan will be determined once those results are back

## 2019-05-07 NOTE — TELEPHONE ENCOUNTER
"Requested Prescriptions   Pending Prescriptions Disp Refills     atorvastatin (LIPITOR) 40 MG tablet [Pharmacy Med Name: ATORVASTATIN 40 MG TABLET]  Last Written Prescription Date:  4/11/19  Last Fill Quantity: 15,  # refills: 0    Last office visit: 5/7/2019 with prescribing provider:  Christiano Resendiz PA-C       Future Office Visit:     15 tablet 0     Sig: TAKE 0.5 TABLETS (20 MG) BY MOUTH DAILY       Statins Protocol Failed - 5/7/2019  2:19 PM        Failed - LDL on file in past 12 months     Recent Labs   Lab Test 03/26/18  1026   LDL 43             Passed - No abnormal creatine kinase in past 12 months     No lab results found.             Passed - Recent (12 mo) or future (30 days) visit within the authorizing provider's specialty     Patient had office visit in the last 12 months or has a visit in the next 30 days with authorizing provider or within the authorizing provider's specialty.  See \"Patient Info\" tab in inbasket, or \"Choose Columns\" in Meds & Orders section of the refill encounter.              Passed - Medication is active on med list        Passed - Patient is age 18 or older          "

## 2019-05-08 ENCOUNTER — TELEPHONE (OUTPATIENT)
Dept: FAMILY MEDICINE | Facility: CLINIC | Age: 74
End: 2019-05-08

## 2019-05-08 DIAGNOSIS — M54.42 LEFT-SIDED LOW BACK PAIN WITH LEFT-SIDED SCIATICA, UNSPECIFIED CHRONICITY: Primary | ICD-10-CM

## 2019-05-08 RX ORDER — ATORVASTATIN CALCIUM 40 MG/1
20 TABLET, FILM COATED ORAL DAILY
Qty: 90 TABLET | Refills: 0 | Status: SHIPPED | OUTPATIENT
Start: 2019-05-08 | End: 2019-08-20

## 2019-05-08 NOTE — TELEPHONE ENCOUNTER
Pt saw Randy Resendiz on 5/7/2019 for back pain.  Randy had asked pt to call back with 2 possible options for back surgeons.  1) [facility]  2) [facility]  Please call pt at [phone] to discuss possible referral to back surgeon.    Thank you.  lianet

## 2019-05-08 NOTE — TELEPHONE ENCOUNTER
Called the pt to let him know Randy Resendiz is out of the office today.      He said that is who his insurance allows - see below.      Pt was given gabapentin.  He thinks that helped.       He said there is something on a CT or MRI that is not a tumor - it is a bulge in the disk.

## 2019-05-08 NOTE — TELEPHONE ENCOUNTER
Routing refill request to provider for review/approval because:  Labs not current:    Lab Results   Component Value Date    CHOL 112 03/26/2018     Lab Results   Component Value Date    HDL 50 03/26/2018     Lab Results   Component Value Date    LDL 43 03/26/2018     Lab Results   Component Value Date    TRIG 93 03/26/2018     Lab Results   Component Value Date    CHOLHDLRATIO 3.9 05/12/2015

## 2019-05-09 NOTE — TELEPHONE ENCOUNTER
Was he able to get the full report? Did he get any recommendations following the report from Dr. Lorenzana at [facility]? How are his symptoms going?

## 2019-05-09 NOTE — TELEPHONE ENCOUNTER
"Patient is taking 3 gabapentin 100mg at night and sleeps well. Has pain during the day when \"out and about\". Does use tila cream and other topicals with some effectiveness.     Does not take Norco as does \"not do much good\".    Advised lidoderm patches. Tylenol 1000 mg every 8 hr.    Dr. Lorenzana's office mailed the imaging reports to  clinic, so should be getting soon per patient.    Patient states they said it was a chipped bone in the spine.    Requesting referral from [facility] in previous message. [facility] or [facility] in [place].    Sounds like they had given him number to a [place] spine provider, but was no longer in service.    Please place referral.    Lin Daniel RN               "

## 2019-05-10 NOTE — TELEPHONE ENCOUNTER
I placed 2 orders: one to [facility] (might be covered) and one to [facility]. Please provide him with the number to schedule. I have not yet seen the copies but will be on the look out

## 2019-05-13 DIAGNOSIS — M54.42 LEFT-SIDED LOW BACK PAIN WITH LEFT-SIDED SCIATICA, UNSPECIFIED CHRONICITY: ICD-10-CM

## 2019-05-13 NOTE — TELEPHONE ENCOUNTER
Called patient to notify: No answer. LVM to return call.    Your provider has referred you to: [facility] [phone]   [email or web address]     OR     (per patient insurance)  [facility]  Phone: [phone]    Lluvia Orantes RN

## 2019-05-13 NOTE — TELEPHONE ENCOUNTER
Requested Prescriptions   Pending Prescriptions Disp Refills     gabapentin (NEURONTIN) 100 MG capsule [Pharmacy Med Name: GABAPENTIN 100 MG CAPSULE] 30 capsule 0     Sig: TAKE 1 CAPSULE BY MOUTH AT BEDTIME INCREASE BY 1 CAP EVERY 2 NIGHTS AS TOLERATED TO 3 TOTAL (300MG)       There is no refill protocol information for this order        Last Written Prescription Date:  5/7/19  Last Fill Quantity: 30,  # refills: 0    Last office visit: 5/7/2019 with prescribing provider:  Christiano Resendiz PA-C        Future Office Visit:

## 2019-05-14 NOTE — TELEPHONE ENCOUNTER
Please call Tommie and get an update on his status and how he is taking this. Is it effective? (just want to make sure before refilling). Has he set up appt with back specialist?

## 2019-05-15 NOTE — TELEPHONE ENCOUNTER
Called the pt to discuss.    He is taking 3 a night before bed.  Yes, he feels this is effective.      [facility] to [facility].  They will call him back by the end of the week.

## 2019-05-16 RX ORDER — GABAPENTIN 300 MG/1
300 CAPSULE ORAL AT BEDTIME
Qty: 90 CAPSULE | Refills: 0 | Status: SHIPPED | OUTPATIENT
Start: 2019-05-16 | End: 2019-07-31

## 2019-06-24 ENCOUNTER — TRANSFERRED RECORDS (OUTPATIENT)
Dept: HEALTH INFORMATION MANAGEMENT | Facility: CLINIC | Age: 74
End: 2019-06-24

## 2019-07-31 DIAGNOSIS — M54.42 LEFT-SIDED LOW BACK PAIN WITH LEFT-SIDED SCIATICA, UNSPECIFIED CHRONICITY: ICD-10-CM

## 2019-07-31 NOTE — TELEPHONE ENCOUNTER
Requested Prescriptions   Pending Prescriptions Disp Refills     gabapentin (NEURONTIN) 300 MG capsule [Pharmacy Med Name: GABAPENTIN 300 MG CAPSULE] 90 capsule 0     Sig: TAKE 1 CAPSULE (300 MG) BY MOUTH AT BEDTIME       There is no refill protocol information for this order        Last Written Prescription Date:  05/16/2019  Last Fill Quantity: 90 capsule,  # refills: 0   Last office visit: 5/7/2019 with prescribing provider:  Christiano Resendiz PA-C    Future Office Visit:   Next 5 appointments (look out 90 days)    Aug 13, 2019  9:20 AM CDT  PHYSICAL with Christiano Rseendiz PA-C  [facility] [address] [place] [id number] [phone]         Routing refill request to provider for review/approval because:  Drug not on the [facility], [facility] or [facility] refill protocol or controlled substance

## 2019-08-01 NOTE — TELEPHONE ENCOUNTER
Please call patient - did he request this? Has he seen neurosurgery to consult for the back? We havent had any updates since May

## 2019-08-02 NOTE — TELEPHONE ENCOUNTER
Tommie called back, advised that he is requesting the medication and yes he went to [facility] in July and they said there was nothing they can do for his back.     Did advise him to have them send us the report.

## 2019-08-05 RX ORDER — GABAPENTIN 300 MG/1
300 CAPSULE ORAL AT BEDTIME
Qty: 90 CAPSULE | Refills: 0 | Status: SHIPPED | OUTPATIENT
Start: 2019-08-05 | End: 2019-08-20

## 2019-08-05 NOTE — TELEPHONE ENCOUNTER
Called patient and informed of refill. Pt stated that he has already been doing PT, says that it has been helping.   Vivienne Adams MA

## 2019-08-05 NOTE — TELEPHONE ENCOUNTER
Routing to PCP, Count includes the [facility] states we should be able to see the records on Care Everywhere but I was unable to see them. Please advise if patient needs OV or if prescribing.  -Brittany Maki

## 2019-08-19 NOTE — PROGRESS NOTES
"SUBJECTIVE:   Tommie Kendrick is a 74 year old male who presents for Preventive Visit.  Are you in the first 12 months of your Medicare coverage?  No    Healthy Habits:     In general, how would you rate your overall health?  Fair    Frequency of exercise:  4-5 days/week    Duration of exercise:  30-45 minutes    Do you usually eat at least 4 servings of fruit and vegetables a day, include whole grains    & fiber and avoid regularly eating high fat or \"junk\" foods?  No    Taking medications regularly:  Yes    Medication side effects:  None    Ability to successfully perform activities of daily living:  No assistance needed    Home Safety:  No safety concerns identified    Hearing Impairment:  Difficulty following a conversation in a noisy restaurant or crowded room    In the past 6 months, have you been bothered by leaking of urine?  No    In general, how would you rate your overall mental or emotional health?  Good      PHQ-2 Total Score: 0    Additional concerns today:  Yes (Back pain )    Do you feel safe in your environment? Yes    Do you have a Health Care Directive? No: Advance care planning was reviewed with patient; patient declined at this time.      Fall risk  Fallen 2 or more times in the past year?: Yes  Any fall with injury in the past year?: No    Cognitive Screening   1) Repeat 3 items (Leader, Season, Table)    2) Clock draw: NORMAL  3) 3 item recall: Recalls 2 objects   Results: NORMAL clock, 1-2 items recalled: COGNITIVE IMPAIRMENT LESS LIKELY    Mini-CogTM Copyright TIANA Berry. Licensed by the author for use in [facility]; reprinted with permission (lucas@[email or web address]). All rights reserved.      Do you have sleep apnea, excessive snoring or daytime drowsiness?: no    Reviewed and updated as needed this visit by clinical staff  Tobacco  Allergies  Meds  Med Hx  Surg Hx  Fam Hx  Soc Hx        Reviewed and updated as needed this visit by Provider        Social History     Tobacco Use     " "Smoking status: Former Smoker     Years: 8.00     Last attempt to quit: 10/28/1980     Years since quittin.8     Smokeless tobacco: Never Used     Tobacco comment: 2 ppweek   Substance Use Topics     Alcohol use: Yes     Alcohol/week: 0.0 - 0.5 oz     Comment: occa       Alcohol Use 2019   Prescreen: >3 drinks/day or >7 drinks/week? No   Prescreen: >3 drinks/day or >7 drinks/week? -         Current providers sharing in care for this patient include:   Patient Care Team:  Christiano Resendiz PA-C as PCP - General (Physician Assistant)  Christiano Resendiz PA-C as Assigned PCP    The following health maintenance items are reviewed in Epic and correct as of today:  Health Maintenance   Topic Date Due     ADVANCE CARE PLANNING  1945     ZOSTER IMMUNIZATION (2 of 3) 2015     MEDICARE ANNUAL WELLNESS VISIT  2019     INFLUENZA VACCINE (1) 2019     FALL RISK ASSESSMENT  2020     LIPID  2023     DTAP/TDAP/TD IMMUNIZATION (3 - Td) 12/15/2026     COLONOSCOPY  2028     PHQ-2  Completed     PNEUMOCOCCAL IMMUNIZATION 65+ LOW/MEDIUM RISK  Completed     AORTIC ANEURYSM SCREENING (SYSTEM ASSIGNED)  Completed     IPV IMMUNIZATION  Aged Out     MENINGITIS IMMUNIZATION  Aged Out     Lab work is in process  Labs reviewed in EPIC  Pneumonia Vaccine: Up to date  SHINGRIX: not yet    Review of Systems   Constitutional: Negative for chills and fever.   HENT: Positive for hearing loss (starting to \"go down\"; he has had this checked previously , tihnks he may need hearing aids (L>R)). Negative for congestion, ear pain and sore throat.    Eyes: Negative for pain and visual disturbance.   Respiratory: Negative for cough and shortness of breath.    Cardiovascular: Negative for chest pain, palpitations and peripheral edema.   Gastrointestinal: Negative for abdominal pain, constipation, diarrhea, heartburn, hematochezia and nausea.   Genitourinary: Negative for dysuria, frequency, genital " "sores, hematuria and urgency.   Musculoskeletal: Negative for arthralgias, joint swelling and myalgias.   Skin: Negative for rash.   Neurological: Negative for dizziness, weakness, headaches and paresthesias.   Psychiatric/Behavioral: Negative for mood changes. The patient is not nervous/anxious.    BACK:  Chris notes that his back is \"getting better\"  He has been in the \"core\" program for 4 months, now in a 3 month program where he works in their gym while continuing on his therapy  ([facility] [place])  Exercising most mornings  Improved ability to sleep at night; still using gabapentin (which helps control symptoms)  Daytime he can go for around an hour until developing pain  Tylenol is less effective  Last time he met with his Surgeon was 6/24/19 - Dr. Burns  Wondering about improved pain control    OBJECTIVE:   /70   Pulse 70   Temp 97.1  F (36.2  C) (Tympanic)   Resp 16   Ht 1.676 m (5' 6\")   Wt 66.3 kg (146 lb 1.6 oz)   SpO2 99%   BMI 23.58 kg/m   Estimated body mass index is 23.58 kg/m  as calculated from the following:    Height as of this encounter: 1.676 m (5' 6\").    Weight as of this encounter: 66.3 kg (146 lb 1.6 oz).  Physical Exam  GENERAL: healthy, alert and no distress  EYES: Eyes grossly normal to inspection, PERRL and conjunctivae and sclerae normal  HENT: ear canals and TM's normal, nose and mouth without ulcers or lesions  RESP: lungs clear to auscultation - no rales, rhonchi or wheezes  CV: regular rate and rhythm, normal S1 S2, no S3 or S4, no murmur, click or rub, no peripheral edema and peripheral pulses strong  MS: extremities normal- no gross deformities noted  BACK: point tenderness in the low mid back  SKIN: no suspicious lesions or rashes  PSYCH: mentation appears normal, affect normal/bright    Diagnostic Test Results:  none     ASSESSMENT / PLAN:   1. Encounter for Medicare annual wellness exam  Reviewed personal and family history. Reviewed age appropriate " "screenings. Recommended any needed vaccinations. He has tried to get shingrix before but pharmacies have been out. He'll plan to recheck  - Comprehensive metabolic panel    2. Chronic midline low back pain without sciatica  Symptoms steadily improving with physical therapy however still with a stagnant pain. He reviewed the surgery consult and that he was informed there is no indication for surgery or injections. He has had great success with gabapentin in the evenings and has tried 100mg during the day when active and felt some improvement. We can continue this for now, however if his symptoms worsen I would like him to again see his specialist to determine if there are other more definitive solutions.   - gabapentin (NEURONTIN) 100 MG capsule; Take 1 capsule (100 mg) by mouth daily Can try increase to 2 caps daily (200mg) as desired  Dispense: 90 capsule; Refill: 1    3. Hyperlipidemia LDL goal <70  Updating screening. Refilling medications  - Comprehensive metabolic panel  - Lipid panel reflex to direct LDL Fasting    End of Life Planning:  Patient currently has an advanced directive: Yes.  Practitioner is supportive of decision.    COUNSELING:  Reviewed preventive health counseling, as reflected in patient instructions    Estimated body mass index is 23.58 kg/m  as calculated from the following:    Height as of this encounter: 1.676 m (5' 6\").    Weight as of this encounter: 66.3 kg (146 lb 1.6 oz).         reports that he quit smoking about 38 years ago. He quit after 8.00 years of use. He has never used smokeless tobacco.      Appropriate preventive services were discussed with this patient, including applicable screening as appropriate for cardiovascular disease, diabetes, osteopenia/osteoporosis, and glaucoma.  As appropriate for age/gender, discussed screening for colorectal cancer, prostate cancer, breast cancer, and cervical cancer. Checklist reviewing preventive services available has been given to the " patient.    Reviewed patients plan of care and provided an AVS. The Basic Care Plan (routine screening as documented in Health Maintenance) for Tommie meets the Care Plan requirement. This Care Plan has been established and reviewed with the Patient.    Counseling Resources:  ATP IV Guidelines  Pooled Cohorts Equation Calculator  Breast Cancer Risk Calculator  FRAX Risk Assessment  ICSI Preventive Guidelines  Dietary Guidelines for Americans, 2010  [employer]'s MyPlate  ASA Prophylaxis  Lung CA Screening    Christiano Resendiz PA-C  [facility]    Identified Health Risks:    The patient was provided with suggestions to help him develop a healthy physical lifestyle.  The patient was counseled and encouraged to consider modifying their diet and eating habits. He was provided with information on recommended healthy diet options.  The patient was provided with written information regarding signs of hearing loss.  He is at risk for falling and has been provided with information to reduce the risk of falling at home.

## 2019-08-20 ENCOUNTER — OFFICE VISIT (OUTPATIENT)
Dept: FAMILY MEDICINE | Facility: CLINIC | Age: 74
End: 2019-08-20
Payer: COMMERCIAL

## 2019-08-20 VITALS
HEIGHT: 66 IN | SYSTOLIC BLOOD PRESSURE: 130 MMHG | OXYGEN SATURATION: 99 % | DIASTOLIC BLOOD PRESSURE: 70 MMHG | HEART RATE: 70 BPM | WEIGHT: 146.1 LBS | RESPIRATION RATE: 16 BRPM | TEMPERATURE: 97.1 F | BODY MASS INDEX: 23.48 KG/M2

## 2019-08-20 DIAGNOSIS — M54.50 CHRONIC MIDLINE LOW BACK PAIN WITHOUT SCIATICA: ICD-10-CM

## 2019-08-20 DIAGNOSIS — I63.81 ACUTE THALAMIC INFARCTION (H): ICD-10-CM

## 2019-08-20 DIAGNOSIS — G89.29 CHRONIC MIDLINE LOW BACK PAIN WITHOUT SCIATICA: ICD-10-CM

## 2019-08-20 DIAGNOSIS — E78.5 HYPERLIPIDEMIA LDL GOAL <70: ICD-10-CM

## 2019-08-20 DIAGNOSIS — Z00.00 ENCOUNTER FOR MEDICARE ANNUAL WELLNESS EXAM: Primary | ICD-10-CM

## 2019-08-20 LAB
ALBUMIN SERPL-MCNC: 3.9 G/DL (ref 3.4–5)
ALP SERPL-CCNC: 96 U/L (ref 40–150)
ALT SERPL W P-5'-P-CCNC: 32 U/L (ref 0–70)
ANION GAP SERPL CALCULATED.3IONS-SCNC: 4 MMOL/L (ref 3–14)
AST SERPL W P-5'-P-CCNC: 31 U/L (ref 0–45)
BILIRUB SERPL-MCNC: 1.5 MG/DL (ref 0.2–1.3)
BUN SERPL-MCNC: 18 MG/DL (ref 7–30)
CALCIUM SERPL-MCNC: 9.2 MG/DL (ref 8.5–10.1)
CHLORIDE SERPL-SCNC: 107 MMOL/L (ref 94–109)
CHOLEST SERPL-MCNC: 115 MG/DL
CO2 SERPL-SCNC: 30 MMOL/L (ref 20–32)
CREAT SERPL-MCNC: 1.12 MG/DL (ref 0.66–1.25)
GFR SERPL CREATININE-BSD FRML MDRD: 64 ML/MIN/{1.73_M2}
GLUCOSE SERPL-MCNC: 102 MG/DL (ref 70–99)
HDLC SERPL-MCNC: 48 MG/DL
LDLC SERPL CALC-MCNC: 38 MG/DL
NONHDLC SERPL-MCNC: 67 MG/DL
POTASSIUM SERPL-SCNC: 4.5 MMOL/L (ref 3.4–5.3)
PROT SERPL-MCNC: 7 G/DL (ref 6.8–8.8)
SODIUM SERPL-SCNC: 141 MMOL/L (ref 133–144)
TRIGL SERPL-MCNC: 143 MG/DL

## 2019-08-20 PROCEDURE — 80053 COMPREHEN METABOLIC PANEL: CPT | Performed by: PHYSICIAN ASSISTANT

## 2019-08-20 PROCEDURE — 36415 COLL VENOUS BLD VENIPUNCTURE: CPT | Performed by: PHYSICIAN ASSISTANT

## 2019-08-20 PROCEDURE — G0439 PPPS, SUBSEQ VISIT: HCPCS | Performed by: PHYSICIAN ASSISTANT

## 2019-08-20 PROCEDURE — 99213 OFFICE O/P EST LOW 20 MIN: CPT | Mod: 25 | Performed by: PHYSICIAN ASSISTANT

## 2019-08-20 PROCEDURE — 80061 LIPID PANEL: CPT | Performed by: PHYSICIAN ASSISTANT

## 2019-08-20 RX ORDER — GABAPENTIN 100 MG/1
100 CAPSULE ORAL DAILY
Qty: 90 CAPSULE | Refills: 1 | Status: SHIPPED | OUTPATIENT
Start: 2019-08-20 | End: 2019-11-20

## 2019-08-20 RX ORDER — GABAPENTIN 300 MG/1
300 CAPSULE ORAL AT BEDTIME
Qty: 90 CAPSULE | Refills: 1 | Status: SHIPPED | OUTPATIENT
Start: 2019-08-20 | End: 2020-04-13

## 2019-08-20 RX ORDER — ATORVASTATIN CALCIUM 40 MG/1
20 TABLET, FILM COATED ORAL DAILY
Qty: 90 TABLET | Refills: 0 | Status: SHIPPED | OUTPATIENT
Start: 2019-08-20 | End: 2020-04-29

## 2019-08-20 ASSESSMENT — ENCOUNTER SYMPTOMS
HEARTBURN: 0
COUGH: 0
FREQUENCY: 0
HEADACHES: 0
SHORTNESS OF BREATH: 0
NERVOUS/ANXIOUS: 0
FEVER: 0
DIZZINESS: 0
PARESTHESIAS: 0
EYE PAIN: 0
CHILLS: 0
PALPITATIONS: 0
JOINT SWELLING: 0
NAUSEA: 0
WEAKNESS: 0
HEMATURIA: 0
DYSURIA: 0
ABDOMINAL PAIN: 0
MYALGIAS: 0
CONSTIPATION: 0
ARTHRALGIAS: 0
DIARRHEA: 0
HEMATOCHEZIA: 0
SORE THROAT: 0

## 2019-08-20 ASSESSMENT — ACTIVITIES OF DAILY LIVING (ADL): CURRENT_FUNCTION: NO ASSISTANCE NEEDED

## 2019-08-20 ASSESSMENT — MIFFLIN-ST. JEOR: SCORE: 1340.46

## 2019-08-20 NOTE — PATIENT INSTRUCTIONS
Patient Education   Personalized Prevention Plan  You are due for the preventive services outlined below.  Your care team is available to assist you in scheduling these services.  If you have already completed any of these items, please share that information with your care team to update in your medical record.  Health Maintenance Due   Topic Date Due     Discuss Advance Care Planning  1945     Zoster (Shingles) Vaccine (2 of 3) 12/17/2015     Annual Wellness Visit  03/26/2019     Your Health Risk Assessment indicates you feel you are not in good health    A healthy lifestyle helps keep the body fit and the mind alert. It helps protect you from disease, helps you fight disease, and helps prevent chronic disease (disease that doesn't go away) from getting worse. This is important as you get older and begin to notice twinges in muscles and joints and a decline in the strength and stamina you once took for granted. A healthy lifestyle includes good healthcare, good nutrition, weight control, recreation, and regular exercise. Avoid harmful substances and do what you can to keep safe. Another part of a healthy lifestyle is stay mentally active and socially involved.    Good healthcare     Have a wellness visit every year.     If you have new symptoms, let us know right away. Don't wait until the next checkup.     Take medicines exactly as prescribed and keep your medicines in a safe place. Tell us if your medicine causes problems.   Healthy diet and weight control     Eat 3 or 4 small, nutritious, low-fat, high-fiber meals a day. Include a variety of fruits, vegetables, and whole-grain foods.     Make sure you get enough calcium in your diet. Calcium, vitamin D, and exercise help prevent osteoporosis (bone thinning).     If you live alone, try eating with others when you can. That way you get a good meal and have company while you eat it.     Try to keep a healthy weight. If you eat more calories than your body  uses for energy, it will be stored as fat and you will gain weight.     Recreation   Recreation is not limited to sports and team events. It includes any activity that provides relaxation, interest, enjoyment, and exercise. Recreation provides an outlet for physical, mental, and social energy. It can give a sense of worth and achievement. It can help you stay healthy.    Mental Exercise and Social Involvement  Mental and emotional health is as important as physical health. Keep in touch with friends and family. Stay as active as possible. Continue to learn and challenge yourself.   Things you can do to stay mentally active are:    Learn something new, like a foreign language or musical instrument.     Play SCRABBLE or do crossword puzzles. If you cannot find people to play these games with you at home, you can play them with others on your computer through the Internet.     Join a games club--anything from card games to chess or checkers or lawn bowling.     Start a new hobby.     Go back to school.     Volunteer.     Read.   Keep up with world events.    Understanding USDA MyPlate  The USDA (U.S. Department of Agriculture) has guidelines to help you make healthy food choices. These are called MyPlate. MyPlate shows the food groups that make up healthy meals using the image of a place setting. Before you eat, think about the healthiest choices for what to put onto your plate or into your cup or bowl. To learn more about building a healthy plate, visit www.choosemyplate.gov.    The food groups    Fruits. Any fruit or 100% fruit juice counts as part of the Fruit Group. Fruits may be fresh, canned, frozen, or dried, and may be whole, cut-up, or pureed. Make half your plate fruits and vegetables.    Vegetables. Any vegetable or 100% vegetable juice counts as a member of the Vegetable Group. Vegetables may be fresh, frozen, canned, or dried. They can be served raw or cooked and may be whole, cut-up, or mashed. Make half  your plate fruits and vegetables.    Grains. All foods made from grains are part of the Grains Group. These include wheat, rice, oats, cornmeal, and barley such as bread, pasta, oatmeal, cereal, tortillas, and grits. Grains should be no more than a quarter of your plate. At least half of your grains should be whole grains.    Protein. This group includes meat, poultry, seafood, beans and peas, eggs, processed soy products (like tofu), nuts (including nut butters), and seeds. Make protein choices no more than a quarter of your plate. Meat and poultry choices should be lean or low fat.    Dairy. All fluid milk products and foods made from milk that contain calcium, like yogurt and cheese, are part of the Dairy Group. (Foods that have little calcium, such as cream, butter, and cream cheese, are not part of the group.) Most dairy choices should be low-fat or fat-free.    Oils. These are fats that are liquid at room temperature. They include canola, corn, olive, soybean, and sunflower oil. Foods that are mainly oil include mayonnaise, certain salad dressings, and soft margarines. You should have only 5 to 7 teaspoons of oils a day. You probably already get this much from the food you eat.  Date Last Reviewed: 8/1/2017 2000-2018 [employer] | [employer]. [address] [place] [address]. All rights reserved. This information is not intended as a substitute for professional medical care. Always follow your healthcare professional's instructions.          Signs of Hearing Loss     Hearing much better with one ear can be a sign of hearing loss.     Hearing loss is a problem shared by many people. In fact, it is one of the most common health conditions, particularly as people age. Most people over age 65 have some hearing loss, and by age 80, almost everyone does. Because hearing loss usually occurs slowly over the years, you may not realize your hearing ability has gotten worse.  Have your hearing  checked  Contact your healthcare provider if you:    Have to strain to hear normal conversation    Have to watch other people s faces very carefully to follow what they re saying    Need to ask people to repeat what they ve said    Often misunderstand what people are saying    Turn the volume of the television or radio up so high that others complain    Feel that people are mumbling when they re talking to you    Find that the effort to hear leaves you feeling tired and irritated    Notice, when using the phone, that you hear better with one ear than the other  Date Last Reviewed: 12/1/2016 2000-2018 [employer]. [address] [place] [address]. All rights reserved. This information is not intended as a substitute for professional medical care. Always follow your healthcare professional's instructions.          Preventing Falls in the Home  An adult or child can fall for many reasons. If you are an older adult, you may fall because your reaction time slows down. Your muscles and joints may get stiff, weak, or less flexible because of illness, medicines, or a physical condition. These things can also make a child more likely to fall or be injured in a fall.  Other health problems that make falls more likely include:    Arthritis    Dizziness or lightheadedness when you get out of bed (orthostatic hypotension)    History of a stroke    Dizziness    Anemia    Certain medicines taken for mental illness    Problems with balance or gait    History of falls with or without an injury    Changes in vision (vision impairment)    Changes in thinking skills and memory (cognitive impairment)  Injuries from a fall can include broken bones, dislocated joints, and cuts. When these injuries are serious enough, they can make it impossible for you or a child who is injured in a fall to live on his or her own.  Prevention tips  To help prevent falls and fall-related injuries, follow the tips  below.   Floors  Make floors safer by doing the following:     Put nonskid pads under area rugs.    Remove throw rugs.    Replace worn floor coverings.    Tack carpets firmly to each step on carpeted stairs. Put nonskid strips on the edges of uncarpeted stairs.    Keep floors and stairs free of clutter and cords.    Arrange furniture so there are clear pathways.    Clean up any spills right away.    Wear shoes that fit.  Bathrooms    Make bathrooms safer by doing the following:     Install grab bars in the tub or shower.    Apply nonskid strips or put a nonskid rubber mat in the tub or shower.    Sit on a bath chair to bathe.    Use bathmats with nonskid backing.  Lighting and the environment  Improve lighting in your home by doing the following:     Keep a flashlight in each room. Or put a lamp next to the bed within easy reach.    Put nightlights in the bedrooms, hallways, kitchen, and bathrooms.    Make sure all stairways have good lighting.    Take your time when going up and down stairs.    Put handrails on both sides of stairs and in walkways for more support. To prevent injury to your wrist or arm, don t use handrails to pull yourself up.    Install grab bars to pull yourself up.    Move or rearrange items that you use often. This will make them easier to find or reach.    Look at your home to find any safety hazards. Especially look at doorways, walkways, and the driveway. Remove or repair any safety problems that you find.  Date Last Reviewed: 8/1/2016 2000-2018 [employer]. [address] [place] [address]. All rights reserved. This information is not intended as a substitute for professional medical care. Always follow your healthcare professional's instructions.

## 2019-08-20 NOTE — LETTER
August 22, 2019      Tommie Kendrick  [address] [facility] [address]        Dear Mr. Tommie Kendrick,    León Reilly really good seeing you this week.  Attached are copies of your labs.     Overall they look quite good. Your cholesterol remains exemplary. No concerns there.    Your kidney function, electrolytes and liver function look great too. Your sugar level was just a bit high, but I think we can just keep an eye on this. It's been like this for a number of years. Continued exercise and healthy eating will keep this in check.    The bilirubin total level is just a bit high but has been stable for a few years and I think nothing of consequence.     Again, overall these are excellent.   Please let me know if you have any questions,    Randy Resendiz PA-C

## 2019-11-20 DIAGNOSIS — G89.29 CHRONIC MIDLINE LOW BACK PAIN WITHOUT SCIATICA: ICD-10-CM

## 2019-11-20 DIAGNOSIS — M54.50 CHRONIC MIDLINE LOW BACK PAIN WITHOUT SCIATICA: ICD-10-CM

## 2019-11-20 NOTE — TELEPHONE ENCOUNTER
Patient needs to be contacted to determine how much Gabapentin patient takes during the day, 1 or 2 tabs.  Devorah Capps RN

## 2019-11-20 NOTE — TELEPHONE ENCOUNTER
Controlled Substance Refill Request forgabapentin (NEURONTIN) 100 MG capsule     Problem List Complete:  No     PROVIDER TO CONSIDER COMPLETION OF PROBLEM LIST AND OVERVIEW/CONTROLLED SUBSTANCE AGREEMENT    Last Written Prescription Date:  8/20/19  Last Fill Quantity: 90 capsule,   # refills: 1    THE MOST RECENT OFFICE VISIT MUST BE WITHIN THE PAST 3 MONTHS. AT LEAST ONE FACE TO FACE VISIT MUST OCCUR EVERY 6 MONTHS. ADDITIONAL VISITS CAN BE VIRTUAL.  (THIS STATEMENT SHOULD BE DELETED.)    Last Office Visit with [facility] primary care provider: 8/20/19 with Martín Crowder Office visit:     Controlled substance agreement:   Encounter-Level CSA:    There are no encounter-level csa.     Patient-Level CSA:    There are no patient-level csa.         Last Urine Drug Screen: No results found for: CDAUT, No results found for: COMDAT, No results found for: THC13, PCP13, COC13, MAMP13, OPI13, AMP13, BZO13, TCA13, MTD13, BAR13, OXY13, PPX13, BUP13     Processing:  Fax Rx to [facility] [id number] [place] [address] pharmacy     [email or web address]       checked in past 3 months?  No, route to RN

## 2019-11-22 RX ORDER — GABAPENTIN 100 MG/1
100 CAPSULE ORAL DAILY
Qty: 90 CAPSULE | Refills: 1 | Status: SHIPPED | OUTPATIENT
Start: 2019-11-22 | End: 2020-04-07

## 2019-11-22 NOTE — TELEPHONE ENCOUNTER
patient states he is taking one tablet daily. Has not tried the 2 tablets. patient states he take this prn- can goes a couple days without    Kiki DOUGLASRN BSN  [facility]  [phone]

## 2019-11-22 NOTE — TELEPHONE ENCOUNTER
Called the Pt to discuss, LM, waiting callback.     Valentina Martini RN -- [facility]

## 2020-04-05 DIAGNOSIS — G89.29 CHRONIC MIDLINE LOW BACK PAIN WITHOUT SCIATICA: Primary | ICD-10-CM

## 2020-04-05 DIAGNOSIS — M54.50 CHRONIC MIDLINE LOW BACK PAIN WITHOUT SCIATICA: Primary | ICD-10-CM

## 2020-04-07 RX ORDER — GABAPENTIN 100 MG/1
100 CAPSULE ORAL DAILY
Qty: 90 CAPSULE | Refills: 1 | Status: SHIPPED | OUTPATIENT
Start: 2020-04-07 | End: 2020-09-10

## 2020-04-07 NOTE — TELEPHONE ENCOUNTER
gabapentin (NEURONTIN) 100 MG capsule       Sig - Route: TAKE 1 CAPSULE (100 MG) BY MOUTH DAILY CAN TRY INCREASE TO 2 CAPS DAILY (200MG) AS DESIRED - Oral   Last Written Prescription Date:  11/22/19  Last Fill Quantity: 90,   # refills: 1  Last Office Visit: 8/20/19  Future Office visit:   none    Routing refill request to provider for review/approval because:  Drug not on the [facility], P or [facility] refill protocol or controlled substance    Esa Simon CMA ([facility])

## 2020-04-10 DIAGNOSIS — M51.379 DEGENERATION OF LUMBAR OR LUMBOSACRAL INTERVERTEBRAL DISC: Primary | ICD-10-CM

## 2020-04-10 NOTE — TELEPHONE ENCOUNTER
Pending Prescriptions:                       Disp   Refills    gabapentin (NEURONTIN) 300 MG capsule     90 cap*1            Sig: Take 1 capsule (300 mg) by mouth At Bedtime    Pt called for Rx refill, please contact if there are further questions/concerns.    Meme Aviles on 4/10/2020 at 10:20 AM

## 2020-04-13 RX ORDER — GABAPENTIN 300 MG/1
300 CAPSULE ORAL AT BEDTIME
Qty: 90 CAPSULE | Refills: 0 | Status: SHIPPED | OUTPATIENT
Start: 2020-04-13 | End: 2020-07-07

## 2020-04-13 NOTE — TELEPHONE ENCOUNTER
Per 8/20/2019 note patient taking in evening in addition to the 100mg tablets that were recently refilled.  Approved per [facility] CPA.

## 2020-04-29 DIAGNOSIS — I63.81 ACUTE THALAMIC INFARCTION (H): ICD-10-CM

## 2020-04-29 RX ORDER — ATORVASTATIN CALCIUM 40 MG/1
20 TABLET, FILM COATED ORAL DAILY
Qty: 45 TABLET | Refills: 0 | Status: SHIPPED | OUTPATIENT
Start: 2020-04-29 | End: 2020-07-28

## 2020-07-06 DIAGNOSIS — M51.379 DEGENERATION OF LUMBAR OR LUMBOSACRAL INTERVERTEBRAL DISC: ICD-10-CM

## 2020-07-07 RX ORDER — GABAPENTIN 300 MG/1
CAPSULE ORAL
Qty: 90 CAPSULE | Refills: 0 | Status: SHIPPED | OUTPATIENT
Start: 2020-07-07 | End: 2020-10-08

## 2020-09-09 DIAGNOSIS — M51.379 DEGENERATION OF LUMBAR OR LUMBOSACRAL INTERVERTEBRAL DISC: ICD-10-CM

## 2020-09-10 RX ORDER — GABAPENTIN 300 MG/1
CAPSULE ORAL
Qty: 90 CAPSULE | Refills: 0
Start: 2020-09-10

## 2020-09-10 NOTE — TELEPHONE ENCOUNTER
gabapentin (NEURONTIN) 300 MG capsule  Last Written Prescription Date:  7/7/20  Last Fill Quantity: 90,   # refills: 0  Last Office Visit: 8/20/20  Future Office visit:       Routing refill request to provider for review/approval because:  Drug not on the [facility], [facility] or [facility] refill protocol or controlled substance    : 7/7/20, 6/16/20, 4/13/20    Will also forward to TC's patient is due for an appt with Zandra Luna RN on 9/10/2020 at 11:08 AM

## 2020-09-10 NOTE — TELEPHONE ENCOUNTER
Pt stated he is leaving [facility] Saturday (9/12/2020) morning and needed it refilled before then.    Brenda Cat on 9/10/2020 at 10:49 AM

## 2020-10-06 NOTE — PROGRESS NOTES
Subjective     Tommie Kendrick is a 75 year old male who presents to clinic today for the following health issues:    HPI         Medication Followup of Gabapentin  Taking Medication as prescribed: yes    Side Effects:  None    Medication Helping Symptoms:  yes     Tommie Kendrick is a 75 year old male who presents today for medication check  His rehab location has been shut down but he has continued his exercises at home  Using a Yair chair, tread mill and bike  He remains active  Gabapentin continues to help with the back pain as well as sleep   -at times that is when he'll add the second   Denies any new sharper or shooting pain into the legs  No chest pain or shortness of breath when active    Review of Systems   Constitutional, HEENT, cardiovascular, pulmonary, gi and gu systems are negative, except as otherwise noted.      Objective    /60 (BP Location: Right arm, Patient Position: Sitting, Cuff Size: Adult Regular)   Pulse 64   Temp 98.1  F (36.7  C)   Wt 64 kg (141 lb)   SpO2 97%   BMI 22.76 kg/m    Body mass index is 22.76 kg/m .  Physical Exam   GENERAL: healthy, alert and no distress  RESP: lungs clear to auscultation - no rales, rhonchi or wheezes  CV: regular rate and rhythm, normal S1 S2, no S3 or S4, no murmur, click or rub, no peripheral edema and peripheral pulses strong  MS: no gross musculoskeletal defects noted, no edema  BACK: no CVA tenderness, no paralumbar tenderness            Assessment & Plan     Need for prophylactic vaccination and inoculation against influenza  - FLU VAC QUAD SPLIT VIR, IM (6+ MO)    Degeneration of lumbar or lumbosacral intervertebral disc  Chronic midline low back pain without sciatica  Continues with adequate control. No worsening of symptoms. His physical therapy did shut things down but he continued his regimen at home. Using 300mg at night and occasionally 1-200mg during the day with flares. Effective and tolerating. Refills as needed.  - CBC with  platelets  - gabapentin (NEURONTIN) 100 MG capsule; Take 1 capsule (100 mg) by mouth daily Can try increase to 2 caps daily (200mg) as desired    Acute thalamic infarction (H)  Continue on daily asa, lipitor and risk factor reduction. updating cholesterol and cmp today.  - Comprehensive metabolic panel (BMP + Alb, Alk Phos, ALT, AST, Total. Bili, TP)  - Lipid panel reflex to direct LDL Fasting          Return for Follow up in 6 months for Medication Check, annual wellness.    JESENIA Rivers [facility]

## 2020-10-08 ENCOUNTER — OFFICE VISIT (OUTPATIENT)
Dept: FAMILY MEDICINE | Facility: CLINIC | Age: 75
End: 2020-10-08
Payer: COMMERCIAL

## 2020-10-08 VITALS
HEART RATE: 64 BPM | SYSTOLIC BLOOD PRESSURE: 120 MMHG | OXYGEN SATURATION: 97 % | TEMPERATURE: 98.1 F | DIASTOLIC BLOOD PRESSURE: 60 MMHG | BODY MASS INDEX: 22.76 KG/M2 | WEIGHT: 141 LBS

## 2020-10-08 DIAGNOSIS — Z23 NEED FOR PROPHYLACTIC VACCINATION AND INOCULATION AGAINST INFLUENZA: Primary | ICD-10-CM

## 2020-10-08 DIAGNOSIS — I63.81 ACUTE THALAMIC INFARCTION (H): ICD-10-CM

## 2020-10-08 DIAGNOSIS — M51.379 DEGENERATION OF LUMBAR OR LUMBOSACRAL INTERVERTEBRAL DISC: ICD-10-CM

## 2020-10-08 DIAGNOSIS — G89.29 CHRONIC MIDLINE LOW BACK PAIN WITHOUT SCIATICA: ICD-10-CM

## 2020-10-08 DIAGNOSIS — M54.50 CHRONIC MIDLINE LOW BACK PAIN WITHOUT SCIATICA: ICD-10-CM

## 2020-10-08 LAB
ALBUMIN SERPL-MCNC: 3.6 G/DL (ref 3.4–5)
ALP SERPL-CCNC: 85 U/L (ref 40–150)
ALT SERPL W P-5'-P-CCNC: 34 U/L (ref 0–70)
ANION GAP SERPL CALCULATED.3IONS-SCNC: 2 MMOL/L (ref 3–14)
AST SERPL W P-5'-P-CCNC: 27 U/L (ref 0–45)
BILIRUB SERPL-MCNC: 1.3 MG/DL (ref 0.2–1.3)
BUN SERPL-MCNC: 20 MG/DL (ref 7–30)
CALCIUM SERPL-MCNC: 9.3 MG/DL (ref 8.5–10.1)
CHLORIDE SERPL-SCNC: 108 MMOL/L (ref 94–109)
CHOLEST SERPL-MCNC: 117 MG/DL
CO2 SERPL-SCNC: 29 MMOL/L (ref 20–32)
CREAT SERPL-MCNC: 1.12 MG/DL (ref 0.66–1.25)
ERYTHROCYTE [DISTWIDTH] IN BLOOD BY AUTOMATED COUNT: 12.8 % (ref 10–15)
GFR SERPL CREATININE-BSD FRML MDRD: 63 ML/MIN/{1.73_M2}
GLUCOSE SERPL-MCNC: 98 MG/DL (ref 70–99)
HCT VFR BLD AUTO: 44 % (ref 40–53)
HDLC SERPL-MCNC: 65 MG/DL
HGB BLD-MCNC: 13.8 G/DL (ref 13.3–17.7)
LDLC SERPL CALC-MCNC: 37 MG/DL
MCH RBC QN AUTO: 30.7 PG (ref 26.5–33)
MCHC RBC AUTO-ENTMCNC: 31.4 G/DL (ref 31.5–36.5)
MCV RBC AUTO: 98 FL (ref 78–100)
NONHDLC SERPL-MCNC: 52 MG/DL
PLATELET # BLD AUTO: 248 10E9/L (ref 150–450)
POTASSIUM SERPL-SCNC: 4.8 MMOL/L (ref 3.4–5.3)
PROT SERPL-MCNC: 7 G/DL (ref 6.8–8.8)
RBC # BLD AUTO: 4.49 10E12/L (ref 4.4–5.9)
SODIUM SERPL-SCNC: 139 MMOL/L (ref 133–144)
TRIGL SERPL-MCNC: 76 MG/DL
WBC # BLD AUTO: 5.6 10E9/L (ref 4–11)

## 2020-10-08 PROCEDURE — 85027 COMPLETE CBC AUTOMATED: CPT | Performed by: PHYSICIAN ASSISTANT

## 2020-10-08 PROCEDURE — 80061 LIPID PANEL: CPT | Performed by: PHYSICIAN ASSISTANT

## 2020-10-08 PROCEDURE — 99214 OFFICE O/P EST MOD 30 MIN: CPT | Mod: 25 | Performed by: PHYSICIAN ASSISTANT

## 2020-10-08 PROCEDURE — 36415 COLL VENOUS BLD VENIPUNCTURE: CPT | Performed by: PHYSICIAN ASSISTANT

## 2020-10-08 PROCEDURE — 80053 COMPREHEN METABOLIC PANEL: CPT | Performed by: PHYSICIAN ASSISTANT

## 2020-10-08 PROCEDURE — 90471 IMMUNIZATION ADMIN: CPT | Performed by: PHYSICIAN ASSISTANT

## 2020-10-08 PROCEDURE — 90688 IIV4 VACCINE SPLT 0.5 ML IM: CPT | Performed by: PHYSICIAN ASSISTANT

## 2020-10-08 RX ORDER — GABAPENTIN 300 MG/1
CAPSULE ORAL
Qty: 90 CAPSULE | Refills: 1 | Status: SHIPPED | OUTPATIENT
Start: 2020-10-08 | End: 2021-04-19

## 2020-10-08 RX ORDER — GABAPENTIN 100 MG/1
100 CAPSULE ORAL DAILY
Qty: 90 CAPSULE | Refills: 1 | Status: SHIPPED | OUTPATIENT
Start: 2020-10-08 | End: 2021-03-01

## 2020-10-22 DIAGNOSIS — I63.81 ACUTE THALAMIC INFARCTION (H): ICD-10-CM

## 2020-10-22 RX ORDER — ATORVASTATIN CALCIUM 40 MG/1
20 TABLET, FILM COATED ORAL DAILY
Qty: 45 TABLET | Refills: 2 | Status: SHIPPED | OUTPATIENT
Start: 2020-10-22 | End: 2021-08-10

## 2021-01-15 ENCOUNTER — HEALTH MAINTENANCE LETTER (OUTPATIENT)
Age: 76
End: 2021-01-15

## 2021-02-19 ENCOUNTER — IMMUNIZATION (OUTPATIENT)
Dept: NURSING | Facility: CLINIC | Age: 76
End: 2021-02-19
Payer: COMMERCIAL

## 2021-02-19 PROCEDURE — 0001A PR COVID VAC PFIZER DIL RECON 30 MCG/0.3 ML IM: CPT

## 2021-02-19 PROCEDURE — 91300 PR COVID VAC PFIZER DIL RECON 30 MCG/0.3 ML IM: CPT

## 2021-02-25 DIAGNOSIS — M54.50 CHRONIC MIDLINE LOW BACK PAIN WITHOUT SCIATICA: ICD-10-CM

## 2021-02-25 DIAGNOSIS — G89.29 CHRONIC MIDLINE LOW BACK PAIN WITHOUT SCIATICA: ICD-10-CM

## 2021-03-01 RX ORDER — GABAPENTIN 100 MG/1
100 CAPSULE ORAL DAILY
Qty: 90 CAPSULE | Refills: 0 | Status: SHIPPED | OUTPATIENT
Start: 2021-03-01 | End: 2021-04-21

## 2021-03-01 NOTE — TELEPHONE ENCOUNTER
Gabapentin 100 mg  LRF 10/8/2020, disp 90 with 1 refill  LOV 10/8/2020    [place]:     Last fill dates:  12/30/20, 11/20/20 - both disp 90    [place]  [email or web address]      Routing refill request to provider for review/approval because:  Drug not on the [facility] refill protocol

## 2021-03-02 ENCOUNTER — TELEPHONE (OUTPATIENT)
Dept: FAMILY MEDICINE | Facility: CLINIC | Age: 76
End: 2021-03-02

## 2021-03-02 DIAGNOSIS — G89.29 CHRONIC MIDLINE LOW BACK PAIN WITHOUT SCIATICA: ICD-10-CM

## 2021-03-02 DIAGNOSIS — M54.50 CHRONIC MIDLINE LOW BACK PAIN WITHOUT SCIATICA: ICD-10-CM

## 2021-03-02 RX ORDER — GABAPENTIN 100 MG/1
100 CAPSULE ORAL DAILY
Qty: 90 CAPSULE | Refills: 0 | Status: CANCELLED | OUTPATIENT
Start: 2021-03-02

## 2021-03-02 NOTE — TELEPHONE ENCOUNTER
Reason for call:  Medication   If this is a refill request, has the caller requested the refill from the pharmacy already? Yes  Will the patient be using a [facility]? No  Name of the pharmacy and phone number for the current request: [facility] [address] [place] [address] [phone]    Name of the medication requested: Gabapentin 100mg    Other request: N/a    Phone number to reach patient:  Home number on file [phone] (home)    Best Time:  Any time    Can we leave a detailed message on this number?  YES    Travel screening: Not Applicable

## 2021-03-02 NOTE — TELEPHONE ENCOUNTER
Duplicate. Gabapentin 100 mg ordered 3/1/2021 #90, 0 to [facility].     Ema BEY RN

## 2021-03-12 ENCOUNTER — IMMUNIZATION (OUTPATIENT)
Dept: NURSING | Facility: CLINIC | Age: 76
End: 2021-03-12
Attending: INTERNAL MEDICINE
Payer: COMMERCIAL

## 2021-03-12 PROCEDURE — 0002A PR COVID VAC PFIZER DIL RECON 30 MCG/0.3 ML IM: CPT

## 2021-03-12 PROCEDURE — 91300 PR COVID VAC PFIZER DIL RECON 30 MCG/0.3 ML IM: CPT

## 2021-04-19 ENCOUNTER — MYC MEDICAL ADVICE (OUTPATIENT)
Dept: FAMILY MEDICINE | Facility: CLINIC | Age: 76
End: 2021-04-19

## 2021-04-19 DIAGNOSIS — M51.379 DEGENERATION OF LUMBAR OR LUMBOSACRAL INTERVERTEBRAL DISC: ICD-10-CM

## 2021-04-19 RX ORDER — GABAPENTIN 300 MG/1
CAPSULE ORAL
Qty: 90 CAPSULE | Refills: 1 | Status: SHIPPED | OUTPATIENT
Start: 2021-04-19 | End: 2021-06-15

## 2021-04-19 NOTE — TELEPHONE ENCOUNTER
See my chart.     gabapentin (NEURONTIN) 300 MG capsule  Last Written Prescription Date:  10/8/20  Last Fill Quantity: 90,   # refills: 1  Last Office Visit: 10/8/20  Future Office visit:       Routing refill request to provider for review/approval because:  Drug not on the [facility], [facility] or [facility] refill protocol or controlled substance    Jess Luna RN on 4/19/2021 at 11:26 AM

## 2021-04-21 ENCOUNTER — MYC MEDICAL ADVICE (OUTPATIENT)
Dept: FAMILY MEDICINE | Facility: CLINIC | Age: 76
End: 2021-04-21

## 2021-04-21 DIAGNOSIS — G89.29 CHRONIC MIDLINE LOW BACK PAIN WITHOUT SCIATICA: ICD-10-CM

## 2021-04-21 DIAGNOSIS — M54.50 CHRONIC MIDLINE LOW BACK PAIN WITHOUT SCIATICA: ICD-10-CM

## 2021-04-21 RX ORDER — GABAPENTIN 100 MG/1
100 CAPSULE ORAL DAILY
Qty: 90 CAPSULE | Refills: 0 | Status: SHIPPED | OUTPATIENT
Start: 2021-04-21 | End: 2021-06-15

## 2021-04-21 NOTE — TELEPHONE ENCOUNTER
Disp Refills Start End GARY   gabapentin (NEURONTIN) 300 MG capsule 90 capsule 1 4/19/2021  No   Sig: TAKE 1 CAPSULE BY MOUTH AT BEDTIME   Sent to pharmacy as: Gabapentin 300 MG Oral Capsule (NEURONTIN)   Class: E-Prescribe   Order: [id number]   E-Prescribing Status: Receipt confirmed by pharmacy (4/19/2021 11:28 AM CDT)        Disp Refills Start End GARY   gabapentin (NEURONTIN) 100 MG capsule 90 capsule 0 3/1/2021  No   Sig - Route: TAKE 1 CAPSULE (100 MG) BY MOUTH DAILY CAN TRY INCREASE TO 2 CAPS DAILY (200MG) AS DESIRED - Oral   Sent to pharmacy as: Gabapentin 100 MG Oral Capsule (NEURONTIN)   Class: E-Prescribe   Order: [id number]   E-Prescribing Status: Receipt confirmed by pharmacy (3/1/2021  8:30 PM CST)     Patient still needs rx refill of [id number].    Jazlyn Jones RN

## 2021-06-09 ASSESSMENT — ENCOUNTER SYMPTOMS
DIZZINESS: 0
DYSURIA: 0
CONSTIPATION: 0
PALPITATIONS: 0
ABDOMINAL PAIN: 0
NERVOUS/ANXIOUS: 0
NAUSEA: 0
MYALGIAS: 0
JOINT SWELLING: 0
SHORTNESS OF BREATH: 0
SORE THROAT: 0
COUGH: 0
EYE PAIN: 0
HEARTBURN: 0
FREQUENCY: 0
PARESTHESIAS: 0
ARTHRALGIAS: 0
WEAKNESS: 0
HEMATOCHEZIA: 0
FEVER: 0
HEADACHES: 0
CHILLS: 0
DIARRHEA: 0

## 2021-06-09 ASSESSMENT — ACTIVITIES OF DAILY LIVING (ADL): CURRENT_FUNCTION: NO ASSISTANCE NEEDED

## 2021-06-15 ENCOUNTER — OFFICE VISIT (OUTPATIENT)
Dept: FAMILY MEDICINE | Facility: CLINIC | Age: 76
End: 2021-06-15
Payer: COMMERCIAL

## 2021-06-15 VITALS
RESPIRATION RATE: 14 BRPM | HEIGHT: 65 IN | TEMPERATURE: 98 F | WEIGHT: 138.5 LBS | HEART RATE: 61 BPM | DIASTOLIC BLOOD PRESSURE: 64 MMHG | BODY MASS INDEX: 23.07 KG/M2 | SYSTOLIC BLOOD PRESSURE: 136 MMHG | OXYGEN SATURATION: 98 %

## 2021-06-15 DIAGNOSIS — Z00.00 ENCOUNTER FOR MEDICARE ANNUAL WELLNESS EXAM: Primary | ICD-10-CM

## 2021-06-15 DIAGNOSIS — M51.379 DEGENERATION OF LUMBAR OR LUMBOSACRAL INTERVERTEBRAL DISC: ICD-10-CM

## 2021-06-15 DIAGNOSIS — G89.29 CHRONIC MIDLINE LOW BACK PAIN WITHOUT SCIATICA: ICD-10-CM

## 2021-06-15 DIAGNOSIS — E78.5 HYPERLIPIDEMIA LDL GOAL <70: ICD-10-CM

## 2021-06-15 DIAGNOSIS — M54.50 CHRONIC MIDLINE LOW BACK PAIN WITHOUT SCIATICA: ICD-10-CM

## 2021-06-15 DIAGNOSIS — Z86.73 HISTORY OF CVA (CEREBROVASCULAR ACCIDENT): ICD-10-CM

## 2021-06-15 PROCEDURE — 99397 PER PM REEVAL EST PAT 65+ YR: CPT | Performed by: PHYSICIAN ASSISTANT

## 2021-06-15 PROCEDURE — 99213 OFFICE O/P EST LOW 20 MIN: CPT | Mod: 25 | Performed by: PHYSICIAN ASSISTANT

## 2021-06-15 RX ORDER — GABAPENTIN 300 MG/1
CAPSULE ORAL
Qty: 90 CAPSULE | Refills: 1 | Status: SHIPPED | OUTPATIENT
Start: 2021-06-15 | End: 2022-01-14

## 2021-06-15 RX ORDER — GABAPENTIN 100 MG/1
100 CAPSULE ORAL DAILY
Qty: 90 CAPSULE | Refills: 0 | Status: SHIPPED | OUTPATIENT
Start: 2021-06-15 | End: 2021-08-11

## 2021-06-15 ASSESSMENT — ENCOUNTER SYMPTOMS
PARESTHESIAS: 0
ARTHRALGIAS: 0
FEVER: 0
PALPITATIONS: 0
COUGH: 0
ABDOMINAL PAIN: 0
WEAKNESS: 0
JOINT SWELLING: 0
MYALGIAS: 0
CONSTIPATION: 0
NAUSEA: 0
DIZZINESS: 0
EYE PAIN: 0
DYSURIA: 0
SHORTNESS OF BREATH: 0
HEARTBURN: 0
DIARRHEA: 0
HEMATOCHEZIA: 0
HEADACHES: 0
FREQUENCY: 0
SORE THROAT: 0
NERVOUS/ANXIOUS: 0
CHILLS: 0

## 2021-06-15 ASSESSMENT — ACTIVITIES OF DAILY LIVING (ADL): CURRENT_FUNCTION: NO ASSISTANCE NEEDED

## 2021-06-15 ASSESSMENT — PAIN SCALES - GENERAL: PAINLEVEL: NO PAIN (0)

## 2021-06-15 ASSESSMENT — MIFFLIN-ST. JEOR: SCORE: 1281.14

## 2021-06-15 NOTE — PROGRESS NOTES
"SUBJECTIVE:   Tommie Kendrick is a 75 year old male who presents for Preventive Visit.  Is Patient Fasting: Yes       Patient has been advised of split billing requirements and indicates understanding: Yes   Are you in the first 12 months of your Medicare coverage?  No    Healthy Habits:     In general, how would you rate your overall health?  Good    Frequency of exercise:  2-3 days/week    Duration of exercise:  30-45 minutes    Do you usually eat at least 4 servings of fruit and vegetables a day, include whole grains    & fiber and avoid regularly eating high fat or \"junk\" foods?  No    Taking medications regularly:  Yes    Medication side effects:  None    Ability to successfully perform activities of daily living:  No assistance needed    Home Safety:  No safety concerns identified    Hearing Impairment:  No hearing concerns    In the past 6 months, have you been bothered by leaking of urine?  No    In general, how would you rate your overall mental or emotional health?  Good      PHQ-2 Total Score: 0    Additional concerns today:  No    Tommie Kendrick is a 75 year old male who presents today for annual check up  He is doing well overall  He spent a lot of fall and winter helping his son remodel his house  His back thankfully held up during this  Restarted physical therapy around 1-2 months ago when it opened  Feels strong; using gabapentin still at night which really helps; occasional during the day---denies any major side effects    Do you feel safe in your environment? Yes    Have you ever done Advance Care Planning? (For example, a Health Directive, POLST, or a discussion with a medical provider or your loved ones about your wishes): No, advance care planning information given to patient to review.  Patient declined advance care planning discussion at this time.       Fall risk  Fallen 2 or more times in the past year?: Yes  Any fall with injury in the past year?: No    Cognitive Screening   1) Repeat " 3 items (Leader, Season, Table)    2) Clock draw: NORMAL  3) 3 item recall:   Recalls 2 objects   Results: NORMAL clock, 1-2 items recalled: COGNITIVE IMPAIRMENT LESS LIKELY    Mini-CogTM Copyright TIANA Berry. Licensed by the author for use in [facility]; reprinted with permission (lucas@[facility]). All rights reserved.      Do you have sleep apnea, excessive snoring or daytime drowsiness?: no    Reviewed and updated as needed this visit by clinical staff  Tobacco  Allergies  Meds              Reviewed and updated as needed this visit by Provider                Social History     Tobacco Use     Smoking status: Former Smoker     Years: 8.00     Quit date: 10/28/1980     Years since quittin.6     Smokeless tobacco: Never Used     Tobacco comment: 2 ppweek   Substance Use Topics     Alcohol use: Yes     Alcohol/week: 0.0 - 0.8 standard drinks     Comment: occa     If you drink alcohol do you typically have >3 drinks per day or >7 drinks per week? No    Alcohol Use 6/15/2021   Prescreen: >3 drinks/day or >7 drinks/week? -   Prescreen: >3 drinks/day or >7 drinks/week? No       Current providers sharing in care for this patient include:   Patient Care Team:  Christiano Resendiz PA-C as PCP - General (Physician Assistant)  Christiano Resendiz PA-C as Assigned PCP    The following health maintenance items are reviewed in Epic and correct as of today:  Health Maintenance Due   Topic Date Due     ANNUAL REVIEW OF HM ORDERS  Never done     HEPATITIS C SCREENING  Never done     ZOSTER IMMUNIZATION (2 of 3) 2015     FALL RISK ASSESSMENT  2020     Labs reviewed in EPIC      Review of Systems   Constitutional: Negative for chills and fever.   HENT: Positive for hearing loss. Negative for congestion, ear pain and sore throat.    Eyes: Negative for pain and visual disturbance.   Respiratory: Negative for cough and shortness of breath.    Cardiovascular: Negative for chest pain, palpitations and  "peripheral edema.   Gastrointestinal: Negative for abdominal pain, constipation, diarrhea, heartburn, hematochezia and nausea.   Genitourinary: Negative for discharge, dysuria, frequency and urgency.   Musculoskeletal: Negative for arthralgias, joint swelling and myalgias.   Skin: Negative for rash.   Neurological: Negative for dizziness, weakness, headaches and paresthesias.   Psychiatric/Behavioral: Negative for mood changes. The patient is not nervous/anxious.        OBJECTIVE:   /64 (BP Location: Right arm, Patient Position: Chair, Cuff Size: Adult Regular)   Pulse 61   Temp 98  F (36.7  C) (Oral)   Resp 14   Ht 1.645 m (5' 4.75\")   Wt 62.8 kg (138 lb 8 oz)   SpO2 98%   BMI 23.23 kg/m   Estimated body mass index is 23.23 kg/m  as calculated from the following:    Height as of this encounter: 1.645 m (5' 4.75\").    Weight as of this encounter: 62.8 kg (138 lb 8 oz).  Physical Exam  GENERAL: healthy, alert and no distress  EYES: Eyes grossly normal to inspection, PERRL and conjunctivae and sclerae normal  HENT: ear canals and TM's normal, nose and mouth without ulcers or lesions  NECK: no adenopathy, no asymmetry, masses, or scars and thyroid normal to palpation  RESP: lungs clear to auscultation - no rales, rhonchi or wheezes  CV: regular rates and rhythm  ABDOMEN: soft, nontender, no hepatosplenomegaly, no masses and bowel sounds normal  MS: no gross musculoskeletal defects noted, no edema  BACK: there is some slight kyphosis when walking. Overall ROM and movement are ok. Neg SLR. No pain to palpation  PSYCH: mentation appears normal, affect normal/bright    Diagnostic Test Results:  Labs reviewed in Epic  Plan to update when fasting in October    ASSESSMENT / PLAN:   1. Encounter for Medicare annual wellness exam  Reviewed personal and family history. Reviewed age appropriate screenings. Recommended any needed vaccinations. I would like him to complete the shingles vaccine series    2. Degeneration " "of lumbar or lumbosacral intervertebral disc  3. Chronic midline low back pain without sciatica  Overall controlled. He has restarted physical therapy now that it has opened and had been doing the exercises at home as well during the public health crisis. He was able to help his son redo much of a house wihout a significant flare of symptoms. There is no radicular pain  - gabapentin (NEURONTIN) 300 MG capsule; TAKE 1 CAPSULE BY MOUTH AT BEDTIME  Dispense: 90 capsule; Refill: 1  - gabapentin (NEURONTIN) 100 MG capsule; Take 1 capsule (100 mg) by mouth daily Can try increase to 2 caps daily (200mg) as desired  Dispense: 90 capsule; Refill: 0    4. History of CVA (cerebrovascular accident)  5. Hyperlipidemia LDL goal <70  He continues on a daily asa. No other symptoms. We may consider dropping his dose of lipitor to 10mg if his LDL remains low when rechecked in October. Blood pressure remains controlled.       Patient has been advised of split billing requirements and indicates understanding: Yes  COUNSELING:  Reviewed preventive health counseling, as reflected in patient instructions    Estimated body mass index is 23.23 kg/m  as calculated from the following:    Height as of this encounter: 1.645 m (5' 4.75\").    Weight as of this encounter: 62.8 kg (138 lb 8 oz).        He reports that he quit smoking about 40 years ago. He quit after 8.00 years of use. He has never used smokeless tobacco.      Appropriate preventive services were discussed with this patient, including applicable screening as appropriate for cardiovascular disease, diabetes, osteopenia/osteoporosis, and glaucoma.  As appropriate for age/gender, discussed screening for colorectal cancer, prostate cancer, breast cancer, and cervical cancer. Checklist reviewing preventive services available has been given to the patient.    Reviewed patients plan of care and provided an AVS. The Basic Care Plan (routine screening as documented in Health Maintenance) " for Tommie meets the Care Plan requirement. This Care Plan has been established and reviewed with the Patient.    Counseling Resources:  ATP IV Guidelines  Pooled Cohorts Equation Calculator  Breast Cancer Risk Calculator  Breast Cancer: Medication to Reduce Risk  FRAX Risk Assessment  ICSI Preventive Guidelines  Dietary Guidelines for Americans, 2010  USDA's MyPlate  ASA Prophylaxis  Lung CA Screening    JESENIA Rivers [facility]    Identified Health Risks:

## 2021-08-09 DIAGNOSIS — M54.50 CHRONIC MIDLINE LOW BACK PAIN WITHOUT SCIATICA: ICD-10-CM

## 2021-08-09 DIAGNOSIS — M51.379 DEGENERATION OF LUMBAR OR LUMBOSACRAL INTERVERTEBRAL DISC: ICD-10-CM

## 2021-08-09 DIAGNOSIS — I63.81 ACUTE THALAMIC INFARCTION (H): ICD-10-CM

## 2021-08-09 DIAGNOSIS — G89.29 CHRONIC MIDLINE LOW BACK PAIN WITHOUT SCIATICA: ICD-10-CM

## 2021-08-10 NOTE — TELEPHONE ENCOUNTER
LMTCB- to see how many he is taking a day. Directions say may increase to 2 daily.       gabapentin (NEURONTIN) 100 MG capsule     Last Written Prescription Date:  6/15/21  Last Fill Quantity: 90,   # refills: 0  Last Office Visit: 6/15/21  Future Office visit:       Routing refill request to provider for review/approval because:  Drug not on the [facility], [facility] or [facility] refill protocol or controlled substance    Jess Luna RN on 8/10/2021 at 5:06 PM

## 2021-08-11 RX ORDER — ATORVASTATIN CALCIUM 40 MG/1
TABLET, FILM COATED ORAL
Qty: 45 TABLET | Refills: 3 | Status: SHIPPED | OUTPATIENT
Start: 2021-08-11 | End: 2021-12-13

## 2021-08-11 RX ORDER — GABAPENTIN 100 MG/1
100 CAPSULE ORAL 2 TIMES DAILY
Qty: 180 CAPSULE | Refills: 1 | Status: SHIPPED | OUTPATIENT
Start: 2021-08-11 | End: 2022-01-14

## 2021-09-04 ENCOUNTER — HEALTH MAINTENANCE LETTER (OUTPATIENT)
Age: 76
End: 2021-09-04

## 2021-10-04 DIAGNOSIS — E78.5 HYPERLIPIDEMIA LDL GOAL <70: ICD-10-CM

## 2021-10-04 DIAGNOSIS — Z86.73 HISTORY OF CVA (CEREBROVASCULAR ACCIDENT): Primary | ICD-10-CM

## 2021-11-09 ENCOUNTER — LAB (OUTPATIENT)
Dept: LAB | Facility: CLINIC | Age: 76
End: 2021-11-09
Payer: COMMERCIAL

## 2021-11-09 DIAGNOSIS — E78.5 HYPERLIPIDEMIA LDL GOAL <70: ICD-10-CM

## 2021-11-09 DIAGNOSIS — Z86.73 HISTORY OF CVA (CEREBROVASCULAR ACCIDENT): ICD-10-CM

## 2021-11-09 LAB
ALBUMIN SERPL-MCNC: 3.6 G/DL (ref 3.4–5)
ALP SERPL-CCNC: 81 U/L (ref 40–150)
ALT SERPL W P-5'-P-CCNC: 35 U/L (ref 0–70)
ANION GAP SERPL CALCULATED.3IONS-SCNC: 4 MMOL/L (ref 3–14)
AST SERPL W P-5'-P-CCNC: 32 U/L (ref 0–45)
BILIRUB SERPL-MCNC: 1.2 MG/DL (ref 0.2–1.3)
BUN SERPL-MCNC: 14 MG/DL (ref 7–30)
CALCIUM SERPL-MCNC: 9.1 MG/DL (ref 8.5–10.1)
CHLORIDE BLD-SCNC: 106 MMOL/L (ref 94–109)
CHOLEST SERPL-MCNC: 113 MG/DL
CO2 SERPL-SCNC: 28 MMOL/L (ref 20–32)
CREAT SERPL-MCNC: 1 MG/DL (ref 0.66–1.25)
FASTING STATUS PATIENT QL REPORTED: YES
GFR SERPL CREATININE-BSD FRML MDRD: 72 ML/MIN/1.73M2
GLUCOSE BLD-MCNC: 104 MG/DL (ref 70–99)
HDLC SERPL-MCNC: 51 MG/DL
LDLC SERPL CALC-MCNC: 47 MG/DL
NONHDLC SERPL-MCNC: 62 MG/DL
POTASSIUM BLD-SCNC: 4.7 MMOL/L (ref 3.4–5.3)
PROT SERPL-MCNC: 6.9 G/DL (ref 6.8–8.8)
SODIUM SERPL-SCNC: 138 MMOL/L (ref 133–144)
TRIGL SERPL-MCNC: 75 MG/DL

## 2021-11-09 PROCEDURE — 36415 COLL VENOUS BLD VENIPUNCTURE: CPT

## 2021-11-09 PROCEDURE — 80053 COMPREHEN METABOLIC PANEL: CPT

## 2021-11-09 PROCEDURE — 80061 LIPID PANEL: CPT

## 2021-12-11 ENCOUNTER — MYC MEDICAL ADVICE (OUTPATIENT)
Dept: FAMILY MEDICINE | Facility: CLINIC | Age: 76
End: 2021-12-11
Payer: COMMERCIAL

## 2021-12-11 DIAGNOSIS — I63.81 ACUTE THALAMIC INFARCTION (H): ICD-10-CM

## 2021-12-13 RX ORDER — ATORVASTATIN CALCIUM 10 MG/1
10 TABLET, FILM COATED ORAL DAILY
Qty: 90 TABLET | Refills: 2 | Status: SHIPPED | OUTPATIENT
Start: 2021-12-13 | End: 2022-09-08

## 2022-01-11 DIAGNOSIS — G89.29 CHRONIC MIDLINE LOW BACK PAIN WITHOUT SCIATICA: ICD-10-CM

## 2022-01-11 DIAGNOSIS — M51.379 DEGENERATION OF LUMBAR OR LUMBOSACRAL INTERVERTEBRAL DISC: ICD-10-CM

## 2022-01-11 DIAGNOSIS — M54.50 CHRONIC MIDLINE LOW BACK PAIN WITHOUT SCIATICA: ICD-10-CM

## 2022-01-12 NOTE — TELEPHONE ENCOUNTER
gabapentin (NEURONTIN) 300 MG capsule  gabapentin (NEURONTIN) 100 MG capsule        Routing refill request to provider for review/approval because:  Drug not on the [facility], [facility] or [facility] refill protocol or controlled substance    Jazlyn Jones RN

## 2022-01-14 RX ORDER — GABAPENTIN 300 MG/1
CAPSULE ORAL
Qty: 90 CAPSULE | Refills: 1 | Status: SHIPPED | OUTPATIENT
Start: 2022-01-14 | End: 2022-07-12

## 2022-01-14 RX ORDER — GABAPENTIN 100 MG/1
100 CAPSULE ORAL 2 TIMES DAILY
Qty: 180 CAPSULE | Refills: 1 | Status: SHIPPED | OUTPATIENT
Start: 2022-01-14 | End: 2022-03-22

## 2022-02-18 NOTE — TELEPHONE ENCOUNTER
Called patient to notify of new pill size. Patient stated understanding and is in agreement with plan.    Magui COLLINS Triage RN     18-Feb-2022 09:00

## 2022-03-22 DIAGNOSIS — G89.29 CHRONIC MIDLINE LOW BACK PAIN WITHOUT SCIATICA: ICD-10-CM

## 2022-03-22 DIAGNOSIS — M51.379 DEGENERATION OF LUMBAR OR LUMBOSACRAL INTERVERTEBRAL DISC: ICD-10-CM

## 2022-03-22 DIAGNOSIS — M54.50 CHRONIC MIDLINE LOW BACK PAIN WITHOUT SCIATICA: ICD-10-CM

## 2022-03-22 RX ORDER — GABAPENTIN 100 MG/1
100 CAPSULE ORAL 3 TIMES DAILY
Qty: 270 CAPSULE | Refills: 1 | Status: SHIPPED | OUTPATIENT
Start: 2022-03-22 | End: 2023-03-30

## 2022-03-22 NOTE — TELEPHONE ENCOUNTER
gabapentin (NEURONTIN) 100 MG capsule    Pharmacy: Patient states may take up to 3 caps per day. Need new Rx to reflect correct dose.

## 2022-07-12 DIAGNOSIS — M51.379 DEGENERATION OF LUMBAR OR LUMBOSACRAL INTERVERTEBRAL DISC: ICD-10-CM

## 2022-07-12 DIAGNOSIS — M54.50 CHRONIC MIDLINE LOW BACK PAIN WITHOUT SCIATICA: ICD-10-CM

## 2022-07-12 DIAGNOSIS — G89.29 CHRONIC MIDLINE LOW BACK PAIN WITHOUT SCIATICA: ICD-10-CM

## 2022-07-12 RX ORDER — GABAPENTIN 300 MG/1
CAPSULE ORAL
Qty: 90 CAPSULE | Refills: 0 | Status: SHIPPED | OUTPATIENT
Start: 2022-07-12 | End: 2022-08-15

## 2022-07-12 NOTE — TELEPHONE ENCOUNTER
Routing refill request to provider for review/approval because:  Drug not on the [facility] refill protocol     Kelsie Cannon RN on 7/12/2022 at 10:47 AM

## 2022-07-13 ENCOUNTER — MYC MEDICAL ADVICE (OUTPATIENT)
Dept: FAMILY MEDICINE | Facility: CLINIC | Age: 77
End: 2022-07-13

## 2022-08-06 ENCOUNTER — HEALTH MAINTENANCE LETTER (OUTPATIENT)
Age: 77
End: 2022-08-06

## 2022-08-08 ASSESSMENT — ENCOUNTER SYMPTOMS
FREQUENCY: 0
ARTHRALGIAS: 0
DIZZINESS: 0
HEMATOCHEZIA: 0
PARESTHESIAS: 0
FEVER: 0
NAUSEA: 0
NERVOUS/ANXIOUS: 0
SHORTNESS OF BREATH: 0
EYE PAIN: 0
HEADACHES: 0
SORE THROAT: 0
CONSTIPATION: 0
WEAKNESS: 0
HEARTBURN: 0
JOINT SWELLING: 0
COUGH: 0
PALPITATIONS: 0
ABDOMINAL PAIN: 0
DYSURIA: 0
MYALGIAS: 0
HEMATURIA: 0
CHILLS: 0
DIARRHEA: 0

## 2022-08-08 ASSESSMENT — ACTIVITIES OF DAILY LIVING (ADL): CURRENT_FUNCTION: NO ASSISTANCE NEEDED

## 2022-08-15 ENCOUNTER — OFFICE VISIT (OUTPATIENT)
Dept: FAMILY MEDICINE | Facility: CLINIC | Age: 77
End: 2022-08-15
Payer: COMMERCIAL

## 2022-08-15 VITALS
DIASTOLIC BLOOD PRESSURE: 80 MMHG | OXYGEN SATURATION: 96 % | BODY MASS INDEX: 23.25 KG/M2 | HEART RATE: 69 BPM | TEMPERATURE: 98.7 F | RESPIRATION RATE: 16 BRPM | HEIGHT: 64 IN | SYSTOLIC BLOOD PRESSURE: 136 MMHG | WEIGHT: 136.2 LBS

## 2022-08-15 DIAGNOSIS — M51.379 DEGENERATION OF LUMBAR OR LUMBOSACRAL INTERVERTEBRAL DISC: ICD-10-CM

## 2022-08-15 DIAGNOSIS — M54.50 CHRONIC MIDLINE LOW BACK PAIN WITHOUT SCIATICA: ICD-10-CM

## 2022-08-15 DIAGNOSIS — G89.29 CHRONIC MIDLINE LOW BACK PAIN WITHOUT SCIATICA: ICD-10-CM

## 2022-08-15 DIAGNOSIS — Z86.73 HISTORY OF CVA (CEREBROVASCULAR ACCIDENT): ICD-10-CM

## 2022-08-15 DIAGNOSIS — E78.5 HYPERLIPIDEMIA LDL GOAL <70: ICD-10-CM

## 2022-08-15 DIAGNOSIS — Z79.899 MEDICATION MANAGEMENT: ICD-10-CM

## 2022-08-15 DIAGNOSIS — Z23 HIGH PRIORITY FOR 2019-NCOV VACCINE: ICD-10-CM

## 2022-08-15 DIAGNOSIS — Z00.00 ENCOUNTER FOR MEDICARE ANNUAL WELLNESS EXAM: Primary | ICD-10-CM

## 2022-08-15 PROCEDURE — 91305 COVID-19,PF,PFIZER (12+ YRS): CPT | Performed by: PHYSICIAN ASSISTANT

## 2022-08-15 PROCEDURE — 36415 COLL VENOUS BLD VENIPUNCTURE: CPT | Performed by: PHYSICIAN ASSISTANT

## 2022-08-15 PROCEDURE — 80061 LIPID PANEL: CPT | Performed by: PHYSICIAN ASSISTANT

## 2022-08-15 PROCEDURE — 80048 BASIC METABOLIC PNL TOTAL CA: CPT | Performed by: PHYSICIAN ASSISTANT

## 2022-08-15 PROCEDURE — 99397 PER PM REEVAL EST PAT 65+ YR: CPT | Mod: 25 | Performed by: PHYSICIAN ASSISTANT

## 2022-08-15 PROCEDURE — 0054A COVID-19,PF,PFIZER (12+ YRS): CPT | Performed by: PHYSICIAN ASSISTANT

## 2022-08-15 PROCEDURE — 99214 OFFICE O/P EST MOD 30 MIN: CPT | Mod: 25 | Performed by: PHYSICIAN ASSISTANT

## 2022-08-15 RX ORDER — GABAPENTIN 300 MG/1
CAPSULE ORAL
Qty: 90 CAPSULE | Refills: 2 | Status: SHIPPED | OUTPATIENT
Start: 2022-08-15 | End: 2023-07-06

## 2022-08-15 ASSESSMENT — ENCOUNTER SYMPTOMS
FEVER: 0
HEMATURIA: 0
NERVOUS/ANXIOUS: 0
HEARTBURN: 0
COUGH: 0
FREQUENCY: 0
HEADACHES: 0
ARTHRALGIAS: 0
CONSTIPATION: 0
MYALGIAS: 0
PALPITATIONS: 0
DIARRHEA: 0
SORE THROAT: 0
NAUSEA: 0
ABDOMINAL PAIN: 0
SHORTNESS OF BREATH: 0
EYE PAIN: 0
DYSURIA: 0
DIZZINESS: 0
CHILLS: 0
PARESTHESIAS: 0
JOINT SWELLING: 0
HEMATOCHEZIA: 0
WEAKNESS: 0

## 2022-08-15 ASSESSMENT — ACTIVITIES OF DAILY LIVING (ADL): CURRENT_FUNCTION: NO ASSISTANCE NEEDED

## 2022-08-15 NOTE — PATIENT INSTRUCTIONS
Patient Education   Personalized Prevention Plan  You are due for the preventive services outlined below.  Your care team is available to assist you in scheduling these services.  If you have already completed any of these items, please share that information with your care team to update in your medical record.  Health Maintenance Due   Topic Date Due     LUNG CANCER SCREENING  03/29/2005     Zoster (Shingles) Vaccine (2 of 3) 12/17/2015     COVID-19 Vaccine (4 - Booster for Pfizer series) 03/29/2022     Annual Wellness Visit  06/15/2022     ANNUAL REVIEW OF HM ORDERS  06/15/2022        At your visit today, we discussed your risk for falls and preventive options.    Fall Prevention  Falls often occur due to slipping, tripping or losing your balance. Millions of people fall every year and injure themselves. Here are ways to reduce your risk of falling again.     Think about your fall, was there anything that caused your fall that can be fixed, removed, or replaced?    Make your home safe by keeping walkways clear of objects you may trip over, such as electric cords.    Use non-slip pads under rugs. Don't use area rugs or small throw rugs.    Use non-slip mats in bathtubs and showers.    Install handrails and lights on staircases. The handrails should be on both sides of the stairs.    Don't walk in poorly lit areas.    Don't stand on chairs or wobbly ladders.    Use caution when reaching overhead or looking upward. This position can cause a loss of balance.    Be sure your shoes fit properly, have non-slip bottoms and are in good condition.     Wear shoes both inside and out. Don't go barefoot or wear slippers.    Be cautious when going up and down stairs, curbs, and when walking on uneven sidewalks.    If your balance is poor, consider using a cane or walker.    If your fall was related to alcohol use, stop or limit alcohol intake.     If your fall was related to use of sleeping medicines, talk to your healthcare  provider about this. You may need to reduce your dosage at bedtime if you awaken during the night to go to the bathroom.      To reduce the need for nighttime bathroom trips:  ? Don't drink fluids for several hours before going to bed  ? Empty your bladder before going to bed  ? Men can keep a urinal at the bedside    Stay as active as you can. Balance, flexibility, strength, and endurance all come from exercise. They all play a role in preventing falls. Ask your healthcare provider which types of activity are right for you.    Get your vision checked on a regular basis.    If you have pets, know where they are before you stand up or walk so you don't trip over them.    Use night lights.    Go over all your medicines with a pharmacist or other healthcare provider to see if any of them could make you more likely to fall.  Twyla last reviewed this educational content on 4/1/2018 2000-2021 [employer]. All rights reserved. This information is not intended as a substitute for professional medical care. Always follow your healthcare professional's instructions.

## 2022-08-15 NOTE — PROGRESS NOTES
"SUBJECTIVE:   Tommie Kendrick is a 77 year old male who presents for Preventive Visit.      Patient has been advised of split billing requirements and indicates understanding: Yes  Are you in the first 12 months of your Medicare coverage?  No    Healthy Habits:     In general, how would you rate your overall health?  Good    Frequency of exercise:  2-3 days/week    Duration of exercise:  30-45 minutes    Do you usually eat at least 4 servings of fruit and vegetables a day, include whole grains    & fiber and avoid regularly eating high fat or \"junk\" foods?  No    Taking medications regularly:  Yes    Medication side effects:  None    Ability to successfully perform activities of daily living:  No assistance needed    Home Safety:  No safety concerns identified    Hearing Impairment:  Difficulty understanding soft or whispered speech    In the past 6 months, have you been bothered by leaking of urine?  No    In general, how would you rate your overall mental or emotional health?  Good      PHQ-2 Total Score: 0    Additional concerns today:  No    Do you feel safe in your environment? Yes    Have you ever done Advance Care Planning? (For example, a Health Directive, POLST, or a discussion with a medical provider or your loved ones about your wishes): No, advance care planning information given to patient to review.  Patient plans to discuss their wishes with loved ones or provider.        Fall risk  Fallen 2 or more times in the past year?: Yes  Any fall with injury in the past year?: Yes    Cognitive Screening   1) Repeat 3 items (Leader, Season, Table)    2) Clock draw: NORMAL  3) 3 item recall: remember?\"}Recalls 3 objects  Results: 3 items recalled: COGNITIVE IMPAIRMENT LESS LIKELY    Mini-CogTM Copyright TIANA Berry. Licensed by the author for use in [facility]; reprinted with permission ([email or web address]). All rights reserved.      Do you have sleep apnea, excessive snoring or daytime drowsiness?: " no    Reviewed and updated as needed this visit by clinical staff   Tobacco  Allergies       Soc Hx          Reviewed and updated as needed this visit by Provider                   Social History     Tobacco Use     Smoking status: Former Smoker     Years: 8.00     Quit date: 10/28/1980     Years since quittin.8     Smokeless tobacco: Never Used     Tobacco comment: 2 ppweek   Substance Use Topics     Alcohol use: Yes     Alcohol/week: 0.0 - 0.8 standard drinks     Comment: occa         Alcohol Use 2022   Prescreen: >3 drinks/day or >7 drinks/week? No   Prescreen: >3 drinks/day or >7 drinks/week? -         Current providers sharing in care for this patient include:   Patient Care Team:  Christiano Resendiz PA-C as PCP - General (Physician Assistant)  Christiano Resendiz PA-C as Assigned PCP    The following health maintenance items are reviewed in Epic and correct as of today:  Health Maintenance Due   Topic Date Due     ZOSTER IMMUNIZATION (2 of 3) 2015     COVID-19 Vaccine (4 - Booster for Pfizer series) 2022     Lab work is in process  Labs reviewed in EPIC    Review of Systems   Constitutional: Negative for chills and fever.   HENT: Positive for hearing loss. Negative for congestion, ear pain and sore throat.    Eyes: Negative for pain and visual disturbance.   Respiratory: Negative for cough and shortness of breath.    Cardiovascular: Negative for chest pain, palpitations and peripheral edema.   Gastrointestinal: Negative for abdominal pain, constipation, diarrhea, heartburn, hematochezia and nausea.   Genitourinary: Negative for dysuria, frequency, genital sores, hematuria, impotence, penile discharge and urgency.   Musculoskeletal: Negative for arthralgias, joint swelling and myalgias.   Skin: Negative for rash.   Neurological: Negative for dizziness, weakness, headaches and paresthesias.   Psychiatric/Behavioral: Negative for mood changes. The patient is not nervous/anxious.   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    Weight as of this encounter: 61.8 kg (136 lb 3.2 oz).  Physical Exam  GENERAL: healthy, alert and no distress  EYES: Eyes grossly normal to inspection, PERRL and conjunctivae and sclerae normal  HENT: ear canals and TM's normal, nose and mouth without ulcers or lesions  NECK: no adenopathy, no asymmetry, masses, or scars and thyroid normal to palpation  RESP: lungs clear to auscultation - no rales, rhonchi or wheezes  CV: regular rate and rhythm, normal S1 S2, no S3 or S4, no murmur, click or rub, no peripheral edema and peripheral pulses strong  ABDOMEN: soft, nontender, no hepatosplenomegaly, no masses and bowel sounds normal  MS: no gross musculoskeletal defects noted, no edema  SKIN: no suspicious lesions or rashes  NEURO: Normal strength and tone, mentation intact and speech normal  PSYCH: mentation appears normal, affect normal/bright    Diagnostic Test Results:  Labs reviewed in Epic  Updating today     ASSESSMENT / PLAN:   1. Encounter for Medicare annual wellness exam  Reviewed personal and family history. Reviewed age appropriate screenings. Recommended any needed vaccinations.    2. Degeneration of lumbar or lumbosacral intervertebral disc  3. Chronic midline low back pain without sciatica  Continues to be well controlled. He is active " "and tolerating. Updating kidney function  - gabapentin (NEURONTIN) 300 MG capsule; TAKE 1 CAPSULE BY MOUTH EVERYDAY AT BEDTIME  Dispense: 90 capsule; Refill: 2  - Basic metabolic panel  (Ca, Cl, CO2, Creat, Gluc, K, Na, BUN); Future  - Basic metabolic panel  (Ca, Cl, CO2, Creat, Gluc, K, Na, BUN)    4. History of CVA (cerebrovascular accident)  Continue on low dose aspirin and risk factor control  - aspirin (ASA) 81 MG EC tablet; Take 1 tablet (81 mg) by mouth daily  Dispense: 90 tablet; Refill: 3  - Lipid panel reflex to direct LDL Fasting; Future  - Lipid panel reflex to direct LDL Fasting    5. Hyperlipidemia LDL goal <70  Updating after dose reduction to 10mg   - Lipid panel reflex to direct LDL Fasting; Future  - Lipid panel reflex to direct LDL Fasting    6. Medication management  Need to monitor after updated statin dose (lowered to 10mg) and renal function on gabapentin    7. High priority for 2019-nCoV vaccine  - COVID-19,PF,PFIZER (12+ Yrs GRAY LABEL)          COUNSELING:  Reviewed preventive health counseling, as reflected in patient instructions    Estimated body mass index is 23.2 kg/m  as calculated from the following:    Height as of this encounter: 1.632 m (5' 4.25\").    Weight as of this encounter: 61.8 kg (136 lb 3.2 oz).      He reports that he quit smoking about 41 years ago. He quit after 8.00 years of use. He has never used smokeless tobacco.      Appropriate preventive services were discussed with this patient, including applicable screening as appropriate for cardiovascular disease, diabetes, osteopenia/osteoporosis, and glaucoma.  As appropriate for age/gender, discussed screening for colorectal cancer, prostate cancer, breast cancer, and cervical cancer. Checklist reviewing preventive services available has been given to the patient.    Reviewed patients plan of care and provided an AVS. The Basic Care Plan (routine screening as documented in Health Maintenance) for Tommie meets the Care " Plan requirement. This Care Plan has been established and reviewed with the Patient.    Counseling Resources:  ATP IV Guidelines  Pooled Cohorts Equation Calculator  Breast Cancer Risk Calculator  Breast Cancer: Medication to Reduce Risk  FRAX Risk Assessment  ICSI Preventive Guidelines  Dietary Guidelines for Americans, 2010  USDA's MyPlate  ASA Prophylaxis  Lung CA Screening    JESENIA Rivers [facility]    Identified Health Risks:

## 2022-08-16 LAB
ANION GAP SERPL CALCULATED.3IONS-SCNC: 3 MMOL/L (ref 3–14)
BUN SERPL-MCNC: 16 MG/DL (ref 7–30)
CALCIUM SERPL-MCNC: 8.8 MG/DL (ref 8.5–10.1)
CHLORIDE BLD-SCNC: 107 MMOL/L (ref 94–109)
CHOLEST SERPL-MCNC: 116 MG/DL
CO2 SERPL-SCNC: 30 MMOL/L (ref 20–32)
CREAT SERPL-MCNC: 1.05 MG/DL (ref 0.66–1.25)
FASTING STATUS PATIENT QL REPORTED: YES
GFR SERPL CREATININE-BSD FRML MDRD: 73 ML/MIN/1.73M2
GLUCOSE BLD-MCNC: 104 MG/DL (ref 70–99)
HDLC SERPL-MCNC: 49 MG/DL
LDLC SERPL CALC-MCNC: 46 MG/DL
NONHDLC SERPL-MCNC: 67 MG/DL
POTASSIUM BLD-SCNC: 4.3 MMOL/L (ref 3.4–5.3)
SODIUM SERPL-SCNC: 140 MMOL/L (ref 133–144)
TRIGL SERPL-MCNC: 107 MG/DL

## 2022-09-06 DIAGNOSIS — I63.81 ACUTE THALAMIC INFARCTION (H): ICD-10-CM

## 2022-09-08 RX ORDER — ATORVASTATIN CALCIUM 10 MG/1
10 TABLET, FILM COATED ORAL DAILY
Qty: 90 TABLET | Refills: 3 | Status: SHIPPED | OUTPATIENT
Start: 2022-09-08 | End: 2023-08-18

## 2022-09-08 NOTE — TELEPHONE ENCOUNTER
Prescription approved per [facility] Refill Protocol.    Kelsie Cannon RN on 9/8/2022 at 3:44 PM

## 2022-10-03 ENCOUNTER — MYC MEDICAL ADVICE (OUTPATIENT)
Dept: FAMILY MEDICINE | Facility: CLINIC | Age: 77
End: 2022-10-03

## 2022-10-13 ENCOUNTER — MYC MEDICAL ADVICE (OUTPATIENT)
Dept: FAMILY MEDICINE | Facility: CLINIC | Age: 77
End: 2022-10-13

## 2022-10-13 NOTE — TELEPHONE ENCOUNTER
Received call back from patient. Patient Cough is non-productive. No chest heaviness/pressure/pain. No fever. No SOB. Patient feels symptoms are mild. RN advised of home care measures: rest, increased fluids, OTC cough medicine (delsym, robitussin). Patient verbalized understanding and agreed with plan to continue to monitor symptoms and treat at home.     Octavio AWAN RN 10/13/2022 at 11:16 AM

## 2022-10-22 ENCOUNTER — HEALTH MAINTENANCE LETTER (OUTPATIENT)
Age: 77
End: 2022-10-22

## 2023-03-22 NOTE — PROGRESS NOTES
SUBJECTIVE:   Tommie Kendrick is a 73 year old male who presents to clinic today for the following health issues:      Back Pain     Duration: ongoing about 3-4 years, but was outside two days ago chopping ice when back started hurting again        Specific cause: chopping ice    Description:   Location of pain: low back left  Character of pain: dull ache  Pain radiation: only from main spot by a couple inches, not going down legs or other parts of back  New numbness or weakness in legs, not attributed to pain:  no     Intensity: Currently 6/10, At its worst 7-8/10    History:   Pain interferes with job: Not applicable  History of back problems: has had problems with L4-L5 vertebrae before  Any previous MRI or X-rays: Yes- at [place].  Date 12/19/14  Sees a specialist for back pain:  No  Therapies tried without relief: acetaminophen (Tylenol), cold, heat, NSAIDs and stretch    Alleviating factors:   Improved by: rest, only sometimes      Precipitating factors:  Worsened by: gets worse at night after being active all day      Accompanying Signs & Symptoms:  Risk of Fracture:  Age >64  Risk of Cauda Equina:  None  Risk of Infection:  None  Risk of Cancer:  None  Risk of Ankylosing Spondylitis:  Onset at age <35, male, AND morning back stiffness. no     -Patient is a 72yo male with a hx of chronic back pain calling with a NEW onset of back symptoms  -He had slipped on the stairs on Friday night and then later was chipping ice during the day Saturday  -By Saturday night the back seems to feel quite tight; mostly centralized.  -it is a dull pain that stays there while laying flat  -there are no symptoms into the legs  -no changes in urination; no change BM  -has been trying some heat  -in the past with relaxation, tylenol his symptoms have improved    -less effective today  -he has a mild spondyloarthropathy and is followed by [facility]  -wondering about something to help with at night      Problem list and  Spoke with pt in regards to breast biopsy results coming back benign. She verbalized understanding.    histories reviewed & adjusted, as indicated.  Additional history: as documented      Reviewed and updated as needed this visit by clinical staff  Tobacco  Allergies  Meds  Med Hx  Surg Hx  Fam Hx  Soc Hx      Reviewed and updated as needed this visit by Provider         ROS:  Constitutional, HEENT, cardiovascular, pulmonary, gi and gu systems are negative, except as otherwise noted.    OBJECTIVE:     There were no vitals taken for this visit.  There is no height or weight on file to calculate BMI.    Diagnostic Test Results:  none     ASSESSMENT/PLAN:   1. Acute midline low back pain without sciatica  Reviewed risks of not being seen in clinic with patient. Does sound like a back spasm/strain. Continue current management, add stretching and heat and trial below. Side effects reviewed. To follow up in clinic if not improving  - cyclobenzaprine (FLEXERIL) 10 MG tablet; Take 1 tablet (10 mg) by mouth At Bedtime  Dispense: 10 tablet; Refill: 0    PHONE VISIT: 5-10 minutes    Christiano Resendiz PA-C  [facility]

## 2023-03-30 DIAGNOSIS — G89.29 CHRONIC MIDLINE LOW BACK PAIN WITHOUT SCIATICA: ICD-10-CM

## 2023-03-30 DIAGNOSIS — M54.50 CHRONIC MIDLINE LOW BACK PAIN WITHOUT SCIATICA: ICD-10-CM

## 2023-03-30 DIAGNOSIS — M51.379 DEGENERATION OF LUMBAR OR LUMBOSACRAL INTERVERTEBRAL DISC: ICD-10-CM

## 2023-03-30 RX ORDER — GABAPENTIN 100 MG/1
CAPSULE ORAL
Qty: 180 CAPSULE | Refills: 1 | Status: SHIPPED | OUTPATIENT
Start: 2023-03-30 | End: 2023-10-02

## 2023-07-17 ENCOUNTER — PATIENT OUTREACH (OUTPATIENT)
Dept: CARE COORDINATION | Facility: CLINIC | Age: 78
End: 2023-07-17
Payer: COMMERCIAL

## 2023-07-31 ENCOUNTER — PATIENT OUTREACH (OUTPATIENT)
Dept: CARE COORDINATION | Facility: CLINIC | Age: 78
End: 2023-07-31
Payer: COMMERCIAL

## 2023-08-16 DIAGNOSIS — Z86.73 HISTORY OF CVA (CEREBROVASCULAR ACCIDENT): ICD-10-CM

## 2023-08-17 DIAGNOSIS — I63.81 ACUTE THALAMIC INFARCTION (H): ICD-10-CM

## 2023-08-17 RX ORDER — ASPIRIN 81 MG/1
81 TABLET, COATED ORAL DAILY
Qty: 90 TABLET | Refills: 3 | Status: SHIPPED | OUTPATIENT
Start: 2023-08-17 | End: 2024-08-05

## 2023-08-17 NOTE — TELEPHONE ENCOUNTER
Routing refill request to provider for review/approval because:  Patient needs to be seen because it has been more than 1 year since last office visit.  Appointments in Next Year      Oct 02, 2023  7:30 AM  (Arrive by 7:10 AM)  Adult Preventative Visit with Christiano Resendiz PA-C  [facility] ([facility] ) [phone]     Ashley Benton RN on 8/17/2023 at 2:31 PM

## 2023-08-18 RX ORDER — ATORVASTATIN CALCIUM 10 MG/1
10 TABLET, FILM COATED ORAL DAILY
Qty: 90 TABLET | Refills: 0 | Status: SHIPPED | OUTPATIENT
Start: 2023-08-18 | End: 2023-11-13

## 2023-08-18 NOTE — TELEPHONE ENCOUNTER
Medication is being filled for 1 time refill only due to:  Patient needs labs LDL. Patient needs to be seen because it has been more than one year since last visit.    Future Appointments 8/18/2023 - 2/14/2024        Date Visit Type Length Department Provider     10/2/2023  7:30 AM PREVENTATIVE ADULT 30 min RM Christiano Hoang PA-C    Location Instructions:     [facility] is located at [address] near [address]. This is a half mile west of [employer] [address] [place]. If traveling west on [address], turn south onto [address], then west onto [address] to reach the parking lot. If traveling east on [address], turn south directly onto [address].              10/17/2023  8:30 AM PRE-OPERATIVE 30 min RM Christiano Hoang PA-C    Location Instructions:     [facility] is located at [address] near [address]. This is a half mile west of [employer] [employer]. If traveling west on [address], turn south onto [address], then west onto [address] to reach the parking lot. If traveling east on [address], turn south directly onto [facility] [address].                   Sallie Montelongo RN, BSN  [facility]

## 2023-09-16 NOTE — PATIENT INSTRUCTIONS
Ill send you a note about the blood work (maybe decrease lipitor dose?)    Get the shingles vaccine from your pharmacy          Patient Education   Personalized Prevention Plan  You are due for the preventive services outlined below.  Your care team is available to assist you in scheduling these services.  If you have already completed any of these items, please share that information with your care team to update in your medical record.  Health Maintenance Due   Topic Date Due     ANNUAL REVIEW OF HM ORDERS  Never done     Hepatitis C Screening  Never done     Zoster (Shingles) Vaccine (2 of 3) 12/17/2015     FALL RISK ASSESSMENT  08/20/2020        
No

## 2023-09-26 ASSESSMENT — ENCOUNTER SYMPTOMS
NERVOUS/ANXIOUS: 0
NAUSEA: 0
SORE THROAT: 0
DIZZINESS: 0
PARESTHESIAS: 0
WEAKNESS: 0
CHILLS: 0
HEMATURIA: 0
JOINT SWELLING: 0
HEMATOCHEZIA: 0
HEARTBURN: 0
HEADACHES: 0
FEVER: 0
DYSURIA: 0
PALPITATIONS: 0
ABDOMINAL PAIN: 0
COUGH: 0
MYALGIAS: 0
FREQUENCY: 0
CONSTIPATION: 0
DIARRHEA: 0
ARTHRALGIAS: 0
SHORTNESS OF BREATH: 0

## 2023-09-26 ASSESSMENT — ACTIVITIES OF DAILY LIVING (ADL): CURRENT_FUNCTION: NO ASSISTANCE NEEDED

## 2023-10-02 ENCOUNTER — OFFICE VISIT (OUTPATIENT)
Dept: FAMILY MEDICINE | Facility: CLINIC | Age: 78
End: 2023-10-02
Payer: COMMERCIAL

## 2023-10-02 VITALS
RESPIRATION RATE: 14 BRPM | BODY MASS INDEX: 22.82 KG/M2 | SYSTOLIC BLOOD PRESSURE: 152 MMHG | OXYGEN SATURATION: 99 % | TEMPERATURE: 97.8 F | DIASTOLIC BLOOD PRESSURE: 84 MMHG | HEIGHT: 65 IN | WEIGHT: 137 LBS | HEART RATE: 55 BPM

## 2023-10-02 DIAGNOSIS — E78.5 HYPERLIPIDEMIA LDL GOAL <70: ICD-10-CM

## 2023-10-02 DIAGNOSIS — R03.0 ELEVATED BLOOD PRESSURE READING WITHOUT DIAGNOSIS OF HYPERTENSION: ICD-10-CM

## 2023-10-02 DIAGNOSIS — M51.379 DEGENERATION OF LUMBAR OR LUMBOSACRAL INTERVERTEBRAL DISC: ICD-10-CM

## 2023-10-02 DIAGNOSIS — Z00.00 ENCOUNTER FOR MEDICARE ANNUAL WELLNESS EXAM: Primary | ICD-10-CM

## 2023-10-02 DIAGNOSIS — M54.50 CHRONIC MIDLINE LOW BACK PAIN WITHOUT SCIATICA: ICD-10-CM

## 2023-10-02 DIAGNOSIS — G89.29 CHRONIC MIDLINE LOW BACK PAIN WITHOUT SCIATICA: ICD-10-CM

## 2023-10-02 LAB
ANION GAP SERPL CALCULATED.3IONS-SCNC: 8 MMOL/L (ref 7–15)
BUN SERPL-MCNC: 17.8 MG/DL (ref 8–23)
CALCIUM SERPL-MCNC: 9.4 MG/DL (ref 8.8–10.2)
CHLORIDE SERPL-SCNC: 106 MMOL/L (ref 98–107)
CHOLEST SERPL-MCNC: 114 MG/DL
CREAT SERPL-MCNC: 1.24 MG/DL (ref 0.67–1.17)
DEPRECATED HCO3 PLAS-SCNC: 28 MMOL/L (ref 22–29)
EGFRCR SERPLBLD CKD-EPI 2021: 59 ML/MIN/1.73M2
GLUCOSE SERPL-MCNC: 98 MG/DL (ref 70–99)
HDLC SERPL-MCNC: 52 MG/DL
LDLC SERPL CALC-MCNC: 45 MG/DL
NONHDLC SERPL-MCNC: 62 MG/DL
POTASSIUM SERPL-SCNC: 4.4 MMOL/L (ref 3.4–5.3)
SODIUM SERPL-SCNC: 142 MMOL/L (ref 135–145)
TRIGL SERPL-MCNC: 87 MG/DL

## 2023-10-02 PROCEDURE — 99397 PER PM REEVAL EST PAT 65+ YR: CPT | Mod: 25 | Performed by: PHYSICIAN ASSISTANT

## 2023-10-02 PROCEDURE — 90662 IIV NO PRSV INCREASED AG IM: CPT | Performed by: PHYSICIAN ASSISTANT

## 2023-10-02 PROCEDURE — 80061 LIPID PANEL: CPT | Performed by: PHYSICIAN ASSISTANT

## 2023-10-02 PROCEDURE — 80048 BASIC METABOLIC PNL TOTAL CA: CPT | Performed by: PHYSICIAN ASSISTANT

## 2023-10-02 PROCEDURE — 99214 OFFICE O/P EST MOD 30 MIN: CPT | Mod: 25 | Performed by: PHYSICIAN ASSISTANT

## 2023-10-02 PROCEDURE — 36415 COLL VENOUS BLD VENIPUNCTURE: CPT | Performed by: PHYSICIAN ASSISTANT

## 2023-10-02 PROCEDURE — 90471 IMMUNIZATION ADMIN: CPT | Performed by: PHYSICIAN ASSISTANT

## 2023-10-02 RX ORDER — GABAPENTIN 300 MG/1
300 CAPSULE ORAL AT BEDTIME
Qty: 90 CAPSULE | Refills: 1 | Status: SHIPPED | OUTPATIENT
Start: 2023-10-02 | End: 2024-04-02

## 2023-10-02 RX ORDER — LATANOPROST 50 UG/ML
1 SOLUTION/ DROPS OPHTHALMIC DAILY
COMMUNITY
Start: 2023-09-25

## 2023-10-02 RX ORDER — GABAPENTIN 100 MG/1
100 CAPSULE ORAL 2 TIMES DAILY
Qty: 180 CAPSULE | Refills: 1 | Status: SHIPPED | OUTPATIENT
Start: 2023-10-02 | End: 2024-05-08

## 2023-10-02 ASSESSMENT — PAIN SCALES - GENERAL: PAINLEVEL: NO PAIN (0)

## 2023-10-02 ASSESSMENT — ACTIVITIES OF DAILY LIVING (ADL): CURRENT_FUNCTION: NO ASSISTANCE NEEDED

## 2023-10-02 ASSESSMENT — ENCOUNTER SYMPTOMS
ARTHRALGIAS: 0
JOINT SWELLING: 0
DIZZINESS: 0
DYSURIA: 0
SORE THROAT: 0
FEVER: 0
HEARTBURN: 0
MYALGIAS: 0
CONSTIPATION: 0
WEAKNESS: 0
PALPITATIONS: 0
ABDOMINAL PAIN: 0
COUGH: 0
NERVOUS/ANXIOUS: 0
FREQUENCY: 0
HEMATURIA: 0
HEMATOCHEZIA: 0
SHORTNESS OF BREATH: 0
PARESTHESIAS: 0
DIARRHEA: 0
CHILLS: 0
NAUSEA: 0
HEADACHES: 0

## 2023-10-02 NOTE — PROGRESS NOTES
"SUBJECTIVE:   Chris is a 78 year old who presents for Preventive Visit.      10/2/2023     7:22 AM   Additional Questions   Roomed by Cassidy         10/2/2023     7:22 AM   Patient Reported Additional Medications   Patient reports taking the following new medications none       Are you in the first 12 months of your Medicare coverage?  No    Healthy Habits:     In general, how would you rate your overall health?  Good    Frequency of exercise:  2-3 days/week    Duration of exercise:  15-30 minutes    Do you usually eat at least 4 servings of fruit and vegetables a day, include whole grains    & fiber and avoid regularly eating high fat or \"junk\" foods?  No    Taking medications regularly:  Yes    Medication side effects:  None    Ability to successfully perform activities of daily living:  No assistance needed    Home Safety:  No safety concerns identified    Hearing Impairment:  No hearing concerns    In the past 6 months, have you been bothered by leaking of urine?  No    In general, how would you rate your overall mental or emotional health?  Excellent    Additional concerns today:  Yes    Tommie Kendrick is a 78 year old male who presents today for annual wellness  Overall he is doing well  Having cataract surgery after Thanksgiving  He has been exercising regularly at home -  clinic in [place] shut down so he needs to do more; home bike and treadmill      Today's PHQ-2 Score:       10/2/2023     7:11 AM   PHQ-2 ( 1999 Pfizer)   Q1: Little interest or pleasure in doing things 0   Q2: Feeling down, depressed or hopeless 0   PHQ-2 Score 0   Q1: Little interest or pleasure in doing things Not at all   Q2: Feeling down, depressed or hopeless Not at all   PHQ-2 Score 0       Have you ever done Advance Care Planning? (For example, a Health Directive, POLST, or a discussion with a medical provider or your loved ones about your wishes): No, advance care planning information given to patient to review.  Patient " declined advance care planning discussion at this time.       Fall risk  Fallen 2 or more times in the past year?: Yes  Any fall with injury in the past year?: No    Cognitive Screening   1) Repeat 3 items (Leader, Season, Table)    2) Clock draw: NORMAL  3) 3 item recall: Recalls 1 object   Results: NORMAL clock, 1-2 items recalled: COGNITIVE IMPAIRMENT LESS LIKELY    Mini-CogTM Copyright TIANA Berry. Licensed by the author for use in [facility]; reprinted with permission (lucas@[facility]). All rights reserved.      Do you have sleep apnea, excessive snoring or daytime drowsiness? : no    Reviewed and updated as needed this visit by clinical staff   Tobacco  Allergies  Meds              Reviewed and updated as needed this visit by Provider                 Social History     Tobacco Use    Smoking status: Former     Years: 8.00     Types: Cigarettes     Quit date: 10/28/1980     Years since quittin.9     Passive exposure: Never    Smokeless tobacco: Never    Tobacco comments:     2 ppweek   Substance Use Topics    Alcohol use: Yes     Alcohol/week: 0.0 - 0.8 standard drinks of alcohol     Comment: michael             2023     9:38 AM   Alcohol Use   Prescreen: >3 drinks/day or >7 drinks/week? No     Do you have a current opioid prescription? No  Do you use any other controlled substances or medications that are not prescribed by a provider? None      Current providers sharing in care for this patient include:   Patient Care Team:  Christiano Resendiz PA-C as PCP - General (Physician Assistant)  Christiano Resendiz PA-C as Assigned PCP    The following health maintenance items are reviewed in Epic and correct as of today:  Health Maintenance   Topic Date Due    ZOSTER IMMUNIZATION (2 of 3) 2015    MEDICARE ANNUAL WELLNESS VISIT  08/15/2023    COVID-19 Vaccine ( season) 2023    ANNUAL REVIEW OF HM ORDERS  10/02/2024    FALL RISK ASSESSMENT  10/02/2024    DTAP/TDAP/TD  "IMMUNIZATION (3 - Td or Tdap) 12/15/2026    LIPID  08/15/2027    ADVANCE CARE PLANNING  10/02/2028    PHQ-2 (once per calendar year)  Completed    INFLUENZA VACCINE  Completed    Pneumococcal Vaccine: 65+ Years  Completed    IPV IMMUNIZATION  Aged Out    HPV IMMUNIZATION  Aged Out    MENINGITIS IMMUNIZATION  Aged Out    HEPATITIS C SCREENING  Discontinued    COLORECTAL CANCER SCREENING  Discontinued    LUNG CANCER SCREENING  Discontinued     Lab work is in process  Labs reviewed in EPIC      Review of Systems   Constitutional:  Negative for chills and fever.   HENT:  Positive for hearing loss. Negative for congestion, ear pain and sore throat.    Eyes:  Negative for visual disturbance.   Respiratory:  Negative for cough and shortness of breath.    Cardiovascular:  Negative for chest pain, palpitations and peripheral edema.   Gastrointestinal:  Negative for abdominal pain, constipation, diarrhea, heartburn, hematochezia and nausea.   Genitourinary:  Negative for dysuria, frequency, genital sores, hematuria, impotence, penile discharge and urgency.   Musculoskeletal:  Negative for arthralgias, joint swelling and myalgias.   Skin:  Negative for rash.   Neurological:  Negative for dizziness, weakness, headaches and paresthesias.   Psychiatric/Behavioral:  Negative for mood changes. The patient is not nervous/anxious.        OBJECTIVE:   BP (!) 152/84   Pulse 55   Temp 97.8  F (36.6  C) (Oral)   Resp 14   Ht 1.651 m (5' 5\")   Wt 62.1 kg (137 lb)   SpO2 99%   BMI 22.80 kg/m   Estimated body mass index is 22.8 kg/m  as calculated from the following:    Height as of this encounter: 1.651 m (5' 5\").    Weight as of this encounter: 62.1 kg (137 lb).  Physical Exam  GENERAL: healthy, alert and no distress  EYES: Eyes grossly normal to inspection, PERRL and conjunctivae and sclerae normal  HENT: ear canals and TM's normal, nose and mouth without ulcers or lesions  NECK: no adenopathy, no asymmetry, masses, or scars and " thyroid normal to palpation  RESP: lungs clear to auscultation - no rales, rhonchi or wheezes  CV: regular rate and rhythm, normal S1 S2, no S3 or S4, no murmur, click or rub, no peripheral edema and peripheral pulses strong  ABDOMEN: soft, nontender, no hepatosplenomegaly, no masses and bowel sounds normal  MS: no gross musculoskeletal defects noted, no edema  SKIN: no suspicious lesions or rashes  PSYCH: mentation appears normal, affect normal/bright    Diagnostic Test Results:  Labs reviewed in Epic    ASSESSMENT / PLAN:   1. Encounter for Medicare annual wellness exam  Reviewed personal and family history. Reviewed age appropriate screenings. Recommended any needed vaccinations.  - INFLUENZA VACCINE 65+ (FLUZONE HD)    2. Degeneration of lumbar or lumbosacral intervertebral disc  3. Chronic midline low back pain without sciatica  Well controlled. Continue present management.   - gabapentin (NEURONTIN) 300 MG capsule; Take 1 capsule (300 mg) by mouth At Bedtime  Dispense: 90 capsule; Refill: 1  - gabapentin (NEURONTIN) 100 MG capsule; Take 1 capsule (100 mg) by mouth 2 times daily  Dispense: 180 capsule; Refill: 1    4. Elevated blood pressure reading without diagnosis of hypertension  Unclear. This is new for Chris; he has no symptoms. We'll recheck at his pre-op a bit later this month  - Basic metabolic panel  (Ca, Cl, CO2, Creat, Gluc, K, Na, BUN); Future  - Lipid panel reflex to direct LDL Fasting; Future  - Basic metabolic panel  (Ca, Cl, CO2, Creat, Gluc, K, Na, BUN)  - Lipid panel reflex to direct LDL Fasting    5. Hyperlipidemia LDL goal <70  Updating labs. He is on lipitor 10mg  - Lipid panel reflex to direct LDL Fasting; Future  - Lipid panel reflex to direct LDL Fasting        COUNSELING:  Reviewed preventive health counseling, as reflected in patient instructions        He reports that he quit smoking about 42 years ago. His smoking use included cigarettes. He has never been exposed to tobacco smoke. He  has never used smokeless tobacco.      Appropriate preventive services were discussed with this patient, including applicable screening as appropriate for fall prevention, nutrition, physical activity, Tobacco-use cessation, weight loss and cognition.  Checklist reviewing preventive services available has been given to the patient.    Reviewed patients plan of care and provided an AVS. The Basic Care Plan (routine screening as documented in Health Maintenance) for Tommie meets the Care Plan requirement. This Care Plan has been established and reviewed with the Patient.          Christiano Resendiz PA-C  [facility]    Identified Health Risks:  I have reviewed Opioid Use Disorder and Substance Use Disorder risk factors and made any needed referrals.

## 2023-10-02 NOTE — PATIENT INSTRUCTIONS
Lets have you check the blood pressure a few times at home. Goal is <140/<90. I would also have you consider coming in to the clinic in a few weeks and scheduling a nurse visit to recheck BP.     Patient Education   Personalized Prevention Plan  You are due for the preventive services outlined below.  Your care team is available to assist you in scheduling these services.  If you have already completed any of these items, please share that information with your care team to update in your medical record.  Health Maintenance Due   Topic Date Due    Zoster (Shingles) Vaccine (2 of 3) 12/17/2015    Annual Wellness Visit  08/15/2023    ANNUAL REVIEW OF HM ORDERS  08/15/2023    Flu Vaccine (1) 09/01/2023    COVID-19 Vaccine (5 - 2023-24 season) 09/01/2023

## 2023-10-17 ENCOUNTER — OFFICE VISIT (OUTPATIENT)
Dept: FAMILY MEDICINE | Facility: CLINIC | Age: 78
End: 2023-10-17
Payer: COMMERCIAL

## 2023-10-17 VITALS
HEART RATE: 52 BPM | RESPIRATION RATE: 21 BRPM | WEIGHT: 139.3 LBS | SYSTOLIC BLOOD PRESSURE: 170 MMHG | HEIGHT: 65 IN | DIASTOLIC BLOOD PRESSURE: 80 MMHG | BODY MASS INDEX: 23.21 KG/M2 | TEMPERATURE: 97.5 F | OXYGEN SATURATION: 98 %

## 2023-10-17 DIAGNOSIS — H26.9 CATARACT OF BOTH EYES, UNSPECIFIED CATARACT TYPE: ICD-10-CM

## 2023-10-17 DIAGNOSIS — Z01.818 PREOP GENERAL PHYSICAL EXAM: Primary | ICD-10-CM

## 2023-10-17 DIAGNOSIS — I10 ESSENTIAL HYPERTENSION: ICD-10-CM

## 2023-10-17 LAB
ANION GAP SERPL CALCULATED.3IONS-SCNC: 7 MMOL/L (ref 7–15)
BUN SERPL-MCNC: 19.3 MG/DL (ref 8–23)
CALCIUM SERPL-MCNC: 9.6 MG/DL (ref 8.8–10.2)
CHLORIDE SERPL-SCNC: 104 MMOL/L (ref 98–107)
CREAT SERPL-MCNC: 1.22 MG/DL (ref 0.67–1.17)
CREAT UR-MCNC: 89.8 MG/DL
DEPRECATED HCO3 PLAS-SCNC: 28 MMOL/L (ref 22–29)
EGFRCR SERPLBLD CKD-EPI 2021: 60 ML/MIN/1.73M2
GLUCOSE SERPL-MCNC: 100 MG/DL (ref 70–99)
MICROALBUMIN UR-MCNC: <12 MG/L
MICROALBUMIN/CREAT UR: NORMAL MG/G{CREAT}
POTASSIUM SERPL-SCNC: 5 MMOL/L (ref 3.4–5.3)
SODIUM SERPL-SCNC: 139 MMOL/L (ref 135–145)

## 2023-10-17 PROCEDURE — 99214 OFFICE O/P EST MOD 30 MIN: CPT | Performed by: PHYSICIAN ASSISTANT

## 2023-10-17 PROCEDURE — 80048 BASIC METABOLIC PNL TOTAL CA: CPT | Performed by: PHYSICIAN ASSISTANT

## 2023-10-17 PROCEDURE — 36415 COLL VENOUS BLD VENIPUNCTURE: CPT | Performed by: PHYSICIAN ASSISTANT

## 2023-10-17 PROCEDURE — 90480 ADMN SARSCOV2 VAC 1/ONLY CMP: CPT | Performed by: PHYSICIAN ASSISTANT

## 2023-10-17 PROCEDURE — 91320 SARSCV2 VAC 30MCG TRS-SUC IM: CPT | Performed by: PHYSICIAN ASSISTANT

## 2023-10-17 PROCEDURE — 82570 ASSAY OF URINE CREATININE: CPT | Performed by: PHYSICIAN ASSISTANT

## 2023-10-17 PROCEDURE — 82043 UR ALBUMIN QUANTITATIVE: CPT | Performed by: PHYSICIAN ASSISTANT

## 2023-10-17 RX ORDER — RESPIRATORY SYNCYTIAL VIRUS VACCINE 120MCG/0.5
0.5 KIT INTRAMUSCULAR ONCE
Qty: 1 EACH | Refills: 0 | Status: CANCELLED | OUTPATIENT
Start: 2023-10-17 | End: 2023-10-17

## 2023-10-17 RX ORDER — LOSARTAN POTASSIUM 50 MG/1
50 TABLET ORAL DAILY
Qty: 90 TABLET | Refills: 0 | Status: SHIPPED | OUTPATIENT
Start: 2023-10-17 | End: 2024-01-11

## 2023-10-17 ASSESSMENT — PAIN SCALES - GENERAL: PAINLEVEL: NO PAIN (0)

## 2023-10-17 NOTE — PROGRESS NOTES
Activity  For the rest of the day, do NOT:  Work  Stay alone  Drive a car   Operate electrical/power tools or appliances  Drink alcohol  Sign any legal papers  Apply heat to the injection site    You may:  Apply ice to the injection site for up to 15 minutes at a time for comfort as long as ice is sealed in a water-tight bag so that dressings do not become wet. Resume activity as tolerated today  Resume your pre-procedure activity or restrictions tomorrow. Dressing Care  Keep injection site clean and dry. You may use an ice pack on and off over the injection site for the next 24 hours while awake. Bathing  You may shower tomorrow and bathe in two days. Diet  Resume your normal pre-procedure diet today. Medication  Continue home medications, unless otherwise instructed. If you had an Epidural Steroid injection please do not take NSAIDS or blood thinning medicine for 24 hours (Aspirin, Mobic, Aleve, Ibuprofen, Diclofenac, Xarelto, Coumadin, fish oil, ect.)     Follow Up  We will call you in two weeks to evaluate the effectiveness of the procedure, and plan for next steps. If you are Diabetic and received steroids today, please monitor your blood sugars throughout the day for at least the next 3 days and follow a strict diabetic diet and avoid sugars, and simple carbohydrates such as sweets, candy, regular soda. Focus on Vegetables, proteins and complex carbohydrates. Call Dr. Veena Casper office at [phone] if you have the following:  Drainage, increase in redness and/or swelling from the injection site. Some spotting of the dressings over the injection site is normal, but drainage that pools, soaks, or drips from the dressing is not normal.  Temperature above 101 degrees, chills, sweats, or body aches. Numbness or weakness of your legs or arms, different than before the injection. Severe headache. [facility]  [address] [facility] [address]  Phone: [phone]  Primary Provider: Elba Campos  Pre-op Performing Provider: ELBA CAMPOS      PREOPERATIVE EVALUATION:  Today's date: 10/17/2023    Chris is a 78 year old male who presents for a preoperative evaluation.      10/17/2023     8:21 AM   Additional Questions   Roomed by Kellen HOWELL MA   Accompanied by Self         10/17/2023     8:21 AM   Patient Reported Additional Medications   Patient reports taking the following new medications None       Surgical Information:  Surgery/Procedure: Cataract  Surgery Location: [facility]  Surgeon: Dr. Lindsay  Surgery Date: 11/2/2023  Time of Surgery: 8:10 A.M.  Where patient plans to recover: at home with family  Fax number for surgical facility: [phone]    Assessment & Plan     The proposed surgical procedure is considered LOW risk.    Preop general physical exam  Cataract of both eyes, unspecified cataract type    - COVID-19 12+ (2023-24) (PFIZER)    Essential hypertension  New dx. Starting below. Recheck in one week with nurse only. Needs lab recheck in one month  - Basic metabolic panel  (Ca, Cl, CO2, Creat, Gluc, K, Na, BUN); Future  - Albumin Random Urine Quantitative with Creat Ratio; Future  - losartan (COZAAR) 50 MG tablet; Take 1 tablet (50 mg) by mouth daily          Antiplatelet or Anticoagulation Medication Instructions:   - Patient is on no antiplatelet or anticoagulation medications.    Additional Medication Instructions:  Patient is to take all scheduled medications on the day of surgery    RECOMMENDATION:  APPROVAL GIVEN to proceed with proposed procedure, without further diagnostic evaluation.      Subjective       HPI related to upcoming procedure: Cataracts, bilateral; both will be repaired        10/12/2023     5:14 PM   Preop Questions   1. Have you ever had a heart attack or stroke? YES - CVA 2015   2. Have you ever had surgery on  your heart or blood vessels, such as a stent placement, a coronary artery bypass, or surgery on an artery in your head, neck, heart, or legs? YES - ASD repair 2005   3. Do you have chest pain with activity? No   4. Do you have a history of  heart failure? No   5. Do you currently have a cold, bronchitis or symptoms of other infection? No   6. Do you have a cough, shortness of breath, or wheezing? No   7. Do you or anyone in your family have previous history of blood clots? No   8. Do you or does anyone in your family have a serious bleeding problem such as prolonged bleeding following surgeries or cuts? No   9. Have you ever had problems with anemia or been told to take iron pills? No   10. Have you had any abnormal blood loss such as black, tarry or bloody stools? No   11. Have you ever had a blood transfusion? UNKNOWN    12. Are you willing to have a blood transfusion if it is medically needed before, during, or after your surgery? Yes   13. Have you or any of your relatives ever had problems with anesthesia? No   14. Do you have sleep apnea, excessive snoring or daytime drowsiness? No   15. Do you have any artifical heart valves or other implanted medical devices like a pacemaker, defibrillator, or continuous glucose monitor? No   16. Do you have artificial joints? No   17. Are you allergic to latex? No       Health Care Directive:  Patient does not have a Health Care Directive or Living Will: Discussed advance care planning with patient; however, patient declined at this time.    Preoperative Review of :   reviewed - no record of controlled substances prescribed.        Review of Systems  CONSTITUTIONAL: NEGATIVE for fever, chills, change in weight  EYES: POSITIVE for blurred vision bilateral  ENT/MOUTH: NEGATIVE for ear, mouth and throat problems  RESP: NEGATIVE for significant cough or SOB  CV: NEGATIVE for chest pain, palpitations or peripheral edema  ROS otherwise negative    Patient Active Problem  List    Diagnosis Date Noted    Hyperlipidemia LDL goal <70 06/02/2015     Priority: Medium    History of CVA (cerebrovascular accident) 05/11/2015     Priority: Medium     Diagnosis updated by automated process. Provider to review and confirm.      Degenerative spondylolisthesis 01/01/2015     Priority: Medium    Choledocholithiasis 12/19/2012     Priority: Medium    S/P laparoscopic cholecystectomy 12/19/2012     Priority: Medium    ASD (atrial septal defect) 11/11/2010     Priority: Medium     ASD repair, 2004, [facility].        Degeneration of lumbar or lumbosacral intervertebral disc 09/28/2007     Priority: Medium      Past Medical History:   Diagnosis Date    ASD (atrial septal defect), sinus venosus defect     Stroke (H)     thalamic    Thoracic or lumbosacral neuritis or radiculitis, unspecified 09/28/2007     Past Surgical History:   Procedure Laterality Date    COLONOSCOPY  3/04    good    COLONOSCOPY N/A 4/12/2018    Procedure: COLONOSCOPY;  Colonoscopy;  Surgeon: Gregory Santos MD;  Location: [facility]    ENDOSCOPIC RETROGRADE CHOLANGIOPANCREATOGRAM  12/17/2012    Procedure: COMBINED ENDOSCOPIC RETROGRADE CHOLANGIOPANCREATOGRAPHY, SPHINCTEROTOMY;  ercp;  Surgeon: Keri Sandoval MD;  Location:  GI    ENDOSCOPIC RETROGRADE CHOLANGIOPANCREATOGRAM  12/17/2012    Procedure: COMBINED ENDOSCOPIC RETROGRADE CHOLANGIOPANCREATOGRAPHY, REMOVE STONE/BALLOON;;  Surgeon: Keri Sandoval MD;  Location:  GI    HC PRESSURE GRADIENT MEASUREMENT, INITIAL VESSEL  2004    good    HC REMOVAL OF TONSILS,<11 Y/O  1948    Tonsils <11y.o.    LAPAROSCOPIC CHOLECYSTECTOMY WITH CHOLANGIOGRAMS  12/18/2012    Procedure: LAPAROSCOPIC CHOLECYSTECTOMY WITH CHOLANGIOGRAMS;  Laparoscopic cholecystectomy with grams;  Surgeon: Sharda Reilly DO;  Location:  OR    [facility] NONSPECIFIC PROCEDURE  1997    right inguinal hernia repair     ZZC REPR ASD W BYPASS  2004     Current Outpatient Medications   Medication Sig  "Dispense Refill    ASPIRIN LOW DOSE 81 MG EC tablet TAKE 1 TABLET BY MOUTH EVERY DAY 90 tablet 3    atorvastatin (LIPITOR) 10 MG tablet TAKE 1 TABLET (10 MG) BY MOUTH DAILY. 90 tablet 0    gabapentin (NEURONTIN) 100 MG capsule Take 1 capsule (100 mg) by mouth 2 times daily 180 capsule 1    gabapentin (NEURONTIN) 300 MG capsule Take 1 capsule (300 mg) by mouth At Bedtime 90 capsule 1    latanoprost (XALATAN) 0.005 % ophthalmic solution Place 1 drop into both eyes daily         Allergies   Allergen Reactions    Penicillins Swelling     SWELLING OF ARM        Social History     Tobacco Use    Smoking status: Former     Years: 8     Types: Cigarettes     Quit date: 10/28/1980     Years since quittin.9     Passive exposure: Never    Smokeless tobacco: Never    Tobacco comments:     2 ppweek   Substance Use Topics    Alcohol use: Yes     Alcohol/week: 0.0 - 0.8 standard drinks of alcohol     Comment: occa     History   Drug Use No         Objective     BP (!) 170/80   Pulse 52   Temp 97.5  F (36.4  C) (Oral)   Resp 21   Ht 1.651 m (5' 5\")   Wt 63.2 kg (139 lb 4.8 oz)   SpO2 98%   BMI 23.18 kg/m      Physical Exam  GENERAL APPEARANCE: healthy, alert and no distress  EYES: clouded lenses bilaterally  HENT: ear canals and TM's normal and nose and mouth without ulcers or lesions  RESP: lungs clear to auscultation - no rales, rhonchi or wheezes  CV: regular rate and rhythm, normal S1 S2, no S3 or S4 and no murmur, click or rub   ABDOMEN: soft, nontender, no HSM or masses and bowel sounds normal  NEURO: Normal strength and tone, sensory exam grossly normal, mentation intact and speech normal    Recent Labs   Lab Test 10/02/23  0815 08/15/22  1111    140   POTASSIUM 4.4 4.3   CR 1.24* 1.05        Diagnostics:  Recent Results (from the past 24 hour(s))   Basic metabolic panel  (Ca, Cl, CO2, Creat, Gluc, K, Na, BUN)    Collection Time: 10/17/23  9:08 AM   Result Value Ref Range    Sodium 139 135 - 145 mmol/L    " Potassium 5.0 3.4 - 5.3 mmol/L    Chloride 104 98 - 107 mmol/L    Carbon Dioxide (CO2) 28 22 - 29 mmol/L    Anion Gap 7 7 - 15 mmol/L    Urea Nitrogen 19.3 8.0 - 23.0 mg/dL    Creatinine 1.22 (H) 0.67 - 1.17 mg/dL    GFR Estimate 60 (L) >60 mL/min/1.73m2    Calcium 9.6 8.8 - 10.2 mg/dL    Glucose 100 (H) 70 - 99 mg/dL   Albumin Random Urine Quantitative with Creat Ratio    Collection Time: 10/17/23  9:10 AM   Result Value Ref Range    Creatinine Urine mg/dL 89.8 mg/dL    Albumin Urine mg/L <12.0 mg/L    Albumin Urine mg/g Cr        No EKG required for low risk surgery (cataract, skin procedure, breast biopsy, etc).    Revised Cardiac Risk Index (RCRI):  The patient has the following serious cardiovascular risks for perioperative complications:   - Cerebrovascular Disease (TIA or CVA) = 1 point     RCRI Interpretation: 1 point: Class II (low risk - 0.9% complication rate)         Signed Electronically by: Christiano Resendiz PA-C  Copy of this evaluation report is provided to requesting physician.

## 2023-10-17 NOTE — PATIENT INSTRUCTIONS
Preparing for Your Surgery  Getting started  A nurse will call you to review your health history and instructions. They will give you an arrival time based on your scheduled surgery time. Please be ready to share:  Your doctor's clinic name and phone number  Your medical, surgical, and anesthesia history  A list of allergies and sensitivities  A list of medicines, including herbal treatments and over-the-counter drugs  Whether the patient has a legal guardian (ask how to send us the papers in advance)  Please tell us if you're pregnant--or if there's any chance you might be pregnant. Some surgeries may injure a fetus (unborn baby), so they require a pregnancy test. Surgeries that are safe for a fetus don't always need a test, and you can choose whether to have one.   If you have a child who's having surgery, please ask for a copy of Preparing for Your Child's Surgery.    Preparing for surgery  Within 10 to 30 days of surgery: Have a pre-op exam (sometimes called an H&P, or History and Physical). This can be done at a clinic or pre-operative center.  If you're having a , you may not need this exam. Talk to your care team.  At your pre-op exam, talk to your care team about all medicines you take. If you need to stop any medicines before surgery, ask when to start taking them again.  We do this for your safety. Many medicines can make you bleed too much during surgery. Some change how well surgery (anesthesia) drugs work.  Call your insurance company to let them know you're having surgery. (If you don't have insurance, call [phone].)  Call your clinic if there's any change in your health. This includes signs of a cold or flu (sore throat, runny nose, cough, rash, fever). It also includes a scrape or scratch near the surgery site.  If you have questions on the day of surgery, call your hospital or surgery center.  Eating and drinking guidelines  For your safety: Unless your surgeon tells you otherwise,  follow the guidelines below.  Eat and drink as usual until 8 hours before you arrive for surgery. After that, no food or milk.  Drink clear liquids until 2 hours before you arrive. These are liquids you can see through, like water, Gatorade, and Propel Water. They also include plain black coffee and tea (no cream or milk), candy, and breath mints. You can spit out gum when you arrive.  If you drink alcohol: Stop drinking it the night before surgery.  If your care team tells you to take medicine on the morning of surgery, it's okay to take it with a sip of water.  Preventing infection  Shower or bathe the night before and morning of your surgery. Follow the instructions your clinic gave you. (If no instructions, use regular soap.)  Don't shave or clip hair near your surgery site. We'll remove the hair if needed.  Don't smoke or vape the morning of surgery. You may chew nicotine gum up to 2 hours before surgery. A nicotine patch is okay.  Note: Some surgeries require you to completely quit smoking and nicotine. Check with your surgeon.  Your care team will make every effort to keep you safe from infection. We will:  Clean our hands often with soap and water (or an alcohol-based hand rub).  Clean the skin at your surgery site with a special soap that kills germs.  Give you a special gown to keep you warm. (Cold raises the risk of infection.)  Wear special hair covers, masks, gowns and gloves during surgery.  Give antibiotic medicine, if prescribed. Not all surgeries need antibiotics.  What to bring on the day of surgery  Photo ID and insurance card  Copy of your health care directive, if you have one  Glasses and hearing aids (bring cases)  You can't wear contacts during surgery  Inhaler and eye drops, if you use them (tell us about these when you arrive)  CPAP machine or breathing device, if you use them  A few personal items, if spending the night  If you have . . .  A pacemaker, ICD (cardiac defibrillator) or other  implant: Bring the ID card.  An implanted stimulator: Bring the remote control.  A legal guardian: Bring a copy of the certified (court-stamped) guardianship papers.  Please remove any jewelry, including body piercings. Leave jewelry and other valuables at home.  If you're going home the day of surgery  You must have a responsible adult drive you home. They should stay with you overnight as well.  If you don't have someone to stay with you, and you aren't safe to go home alone, we may keep you overnight. Insurance often won't pay for this.  After surgery  If it's hard to control your pain or you need more pain medicine, please call your surgeon's office.  Questions?   If you have any questions for your care team, list them here: _________________________________________________________________________________________________________________________________________________________________________ ____________________________________ ____________________________________ ____________________________________  For informational purposes only. Not to replace the advice of your health care provider. Copyright   2003, 2019 [facility]. All rights reserved. Clinically reviewed by Thelma Aquino MD. [employer]ART[employer] [id number] - REV 12/22.    How to Take Your Medication Before Surgery  - Take all of your medications before surgery as usual

## 2023-10-26 ENCOUNTER — ALLIED HEALTH/NURSE VISIT (OUTPATIENT)
Dept: FAMILY MEDICINE | Facility: CLINIC | Age: 78
End: 2023-10-26
Payer: COMMERCIAL

## 2023-10-26 VITALS — HEART RATE: 59 BPM | DIASTOLIC BLOOD PRESSURE: 64 MMHG | OXYGEN SATURATION: 99 % | SYSTOLIC BLOOD PRESSURE: 134 MMHG

## 2023-10-26 DIAGNOSIS — I10 ESSENTIAL HYPERTENSION: Primary | ICD-10-CM

## 2023-10-26 PROCEDURE — 99207 PR NO CHARGE NURSE ONLY: CPT

## 2023-10-26 RX ORDER — SENNOSIDES 8.6 MG
650 CAPSULE ORAL EVERY 8 HOURS PRN
COMMUNITY

## 2023-10-26 NOTE — PROGRESS NOTES
In for blood pressure check.    Today's reading: There were no vitals taken for this visit.     History   Smoking Status    Former    Years: 8.00    Types: Cigarettes    Quit date: 10/28/1980   Smokeless Tobacco    Never       Current Outpatient Medications   Medication Sig    ASPIRIN LOW DOSE 81 MG EC tablet TAKE 1 TABLET BY MOUTH EVERY DAY    atorvastatin (LIPITOR) 10 MG tablet TAKE 1 TABLET (10 MG) BY MOUTH DAILY.    gabapentin (NEURONTIN) 100 MG capsule Take 1 capsule (100 mg) by mouth 2 times daily    gabapentin (NEURONTIN) 300 MG capsule Take 1 capsule (300 mg) by mouth At Bedtime    latanoprost (XALATAN) 0.005 % ophthalmic solution Place 1 drop into both eyes daily    losartan (COZAAR) 50 MG tablet Take 1 tablet (50 mg) by mouth daily    acetaminophen (TYLENOL) 650 MG CR tablet Take 650 mg by mouth every 8 hours as needed     No current facility-administered medications for this visit.        He is having his blood pressure monitored because it has been borderline elevated and has been on medication.    Tommie has had the following symptoms: none    We discussed follow up as recommended and continue to monitor at home.    Brought in his meter to compare with ours   his reading was 169/74 and then 145/64

## 2023-11-13 DIAGNOSIS — I63.81 ACUTE THALAMIC INFARCTION (H): ICD-10-CM

## 2023-11-13 RX ORDER — ATORVASTATIN CALCIUM 10 MG/1
10 TABLET, FILM COATED ORAL DAILY
Qty: 90 TABLET | Refills: 0 | Status: SHIPPED | OUTPATIENT
Start: 2023-11-13 | End: 2024-02-08

## 2024-01-11 DIAGNOSIS — I10 ESSENTIAL HYPERTENSION: ICD-10-CM

## 2024-01-11 RX ORDER — LOSARTAN POTASSIUM 50 MG/1
50 TABLET ORAL DAILY
Qty: 90 TABLET | Refills: 2 | Status: SHIPPED | OUTPATIENT
Start: 2024-01-11 | End: 2024-10-02

## 2024-02-08 DIAGNOSIS — I63.81 ACUTE THALAMIC INFARCTION (H): ICD-10-CM

## 2024-02-08 RX ORDER — ATORVASTATIN CALCIUM 10 MG/1
10 TABLET, FILM COATED ORAL DAILY
Qty: 90 TABLET | Refills: 3 | Status: SHIPPED | OUTPATIENT
Start: 2024-02-08

## 2024-04-02 DIAGNOSIS — G89.29 CHRONIC MIDLINE LOW BACK PAIN WITHOUT SCIATICA: ICD-10-CM

## 2024-04-02 DIAGNOSIS — M51.379 DEGENERATION OF LUMBAR OR LUMBOSACRAL INTERVERTEBRAL DISC: ICD-10-CM

## 2024-04-02 DIAGNOSIS — M54.50 CHRONIC MIDLINE LOW BACK PAIN WITHOUT SCIATICA: ICD-10-CM

## 2024-04-02 RX ORDER — GABAPENTIN 300 MG/1
300 CAPSULE ORAL AT BEDTIME
Qty: 90 CAPSULE | Refills: 1 | Status: SHIPPED | OUTPATIENT
Start: 2024-04-02

## 2024-05-08 ENCOUNTER — MYC REFILL (OUTPATIENT)
Dept: FAMILY MEDICINE | Facility: CLINIC | Age: 79
End: 2024-05-08
Payer: COMMERCIAL

## 2024-05-08 DIAGNOSIS — M51.379 DEGENERATION OF LUMBAR OR LUMBOSACRAL INTERVERTEBRAL DISC: ICD-10-CM

## 2024-05-08 DIAGNOSIS — M54.50 CHRONIC MIDLINE LOW BACK PAIN WITHOUT SCIATICA: ICD-10-CM

## 2024-05-08 DIAGNOSIS — G89.29 CHRONIC MIDLINE LOW BACK PAIN WITHOUT SCIATICA: ICD-10-CM

## 2024-05-08 RX ORDER — GABAPENTIN 100 MG/1
100 CAPSULE ORAL 2 TIMES DAILY
Qty: 180 CAPSULE | Refills: 1 | Status: SHIPPED | OUTPATIENT
Start: 2024-05-08

## 2024-08-03 DIAGNOSIS — Z86.73 HISTORY OF CVA (CEREBROVASCULAR ACCIDENT): ICD-10-CM

## 2024-08-05 RX ORDER — ASPIRIN 81 MG/1
81 TABLET, COATED ORAL DAILY
Qty: 90 TABLET | Refills: 1 | Status: SHIPPED | OUTPATIENT
Start: 2024-08-05

## 2024-10-02 DIAGNOSIS — I10 ESSENTIAL HYPERTENSION: ICD-10-CM

## 2024-10-02 RX ORDER — LOSARTAN POTASSIUM 50 MG/1
50 TABLET ORAL DAILY
Qty: 90 TABLET | Refills: 0 | Status: SHIPPED | OUTPATIENT
Start: 2024-10-02 | End: 2024-10-14

## 2024-10-08 DIAGNOSIS — M51.379 DEGENERATION OF LUMBAR OR LUMBOSACRAL INTERVERTEBRAL DISC: ICD-10-CM

## 2024-10-08 DIAGNOSIS — G89.29 CHRONIC MIDLINE LOW BACK PAIN WITHOUT SCIATICA: ICD-10-CM

## 2024-10-08 DIAGNOSIS — M54.50 CHRONIC MIDLINE LOW BACK PAIN WITHOUT SCIATICA: ICD-10-CM

## 2024-10-08 RX ORDER — GABAPENTIN 300 MG/1
300 CAPSULE ORAL AT BEDTIME
Qty: 90 CAPSULE | Refills: 0 | Status: SHIPPED | OUTPATIENT
Start: 2024-10-08 | End: 2024-10-14

## 2024-10-09 ENCOUNTER — MYC REFILL (OUTPATIENT)
Dept: FAMILY MEDICINE | Facility: CLINIC | Age: 79
End: 2024-10-09
Payer: COMMERCIAL

## 2024-10-09 DIAGNOSIS — M54.50 CHRONIC MIDLINE LOW BACK PAIN WITHOUT SCIATICA: ICD-10-CM

## 2024-10-09 DIAGNOSIS — G89.29 CHRONIC MIDLINE LOW BACK PAIN WITHOUT SCIATICA: ICD-10-CM

## 2024-10-09 DIAGNOSIS — M51.379 DEGENERATION OF LUMBAR OR LUMBOSACRAL INTERVERTEBRAL DISC: ICD-10-CM

## 2024-10-09 RX ORDER — GABAPENTIN 300 MG/1
300 CAPSULE ORAL AT BEDTIME
Qty: 90 CAPSULE | Refills: 0 | OUTPATIENT
Start: 2024-10-09

## 2024-10-09 SDOH — HEALTH STABILITY: PHYSICAL HEALTH: ON AVERAGE, HOW MANY MINUTES DO YOU ENGAGE IN EXERCISE AT THIS LEVEL?: 60 MIN

## 2024-10-09 SDOH — HEALTH STABILITY: PHYSICAL HEALTH: ON AVERAGE, HOW MANY DAYS PER WEEK DO YOU ENGAGE IN MODERATE TO STRENUOUS EXERCISE (LIKE A BRISK WALK)?: 3 DAYS

## 2024-10-09 ASSESSMENT — SOCIAL DETERMINANTS OF HEALTH (SDOH): HOW OFTEN DO YOU GET TOGETHER WITH FRIENDS OR RELATIVES?: ONCE A WEEK

## 2024-10-14 ENCOUNTER — OFFICE VISIT (OUTPATIENT)
Dept: FAMILY MEDICINE | Facility: CLINIC | Age: 79
End: 2024-10-14
Attending: PHYSICIAN ASSISTANT
Payer: COMMERCIAL

## 2024-10-14 VITALS
WEIGHT: 141 LBS | RESPIRATION RATE: 14 BRPM | SYSTOLIC BLOOD PRESSURE: 124 MMHG | BODY MASS INDEX: 23.49 KG/M2 | DIASTOLIC BLOOD PRESSURE: 64 MMHG | TEMPERATURE: 97.5 F | HEART RATE: 59 BPM | HEIGHT: 65 IN | OXYGEN SATURATION: 98 %

## 2024-10-14 DIAGNOSIS — E78.5 HYPERLIPIDEMIA LDL GOAL <70: ICD-10-CM

## 2024-10-14 DIAGNOSIS — Z29.11 NEED FOR VACCINATION AGAINST RESPIRATORY SYNCYTIAL VIRUS: ICD-10-CM

## 2024-10-14 DIAGNOSIS — Z86.73 HISTORY OF CVA (CEREBROVASCULAR ACCIDENT): ICD-10-CM

## 2024-10-14 DIAGNOSIS — Z00.00 ENCOUNTER FOR MEDICARE ANNUAL WELLNESS EXAM: Primary | ICD-10-CM

## 2024-10-14 DIAGNOSIS — M54.50 CHRONIC MIDLINE LOW BACK PAIN WITHOUT SCIATICA: ICD-10-CM

## 2024-10-14 DIAGNOSIS — H90.5 SENSORINEURAL HEARING LOSS (SNHL) OF LEFT EAR, UNSPECIFIED HEARING STATUS ON CONTRALATERAL SIDE: ICD-10-CM

## 2024-10-14 DIAGNOSIS — I10 ESSENTIAL HYPERTENSION: ICD-10-CM

## 2024-10-14 DIAGNOSIS — N52.9 ERECTILE DYSFUNCTION, UNSPECIFIED ERECTILE DYSFUNCTION TYPE: ICD-10-CM

## 2024-10-14 DIAGNOSIS — M51.370 DEGENERATION OF INTERVERTEBRAL DISC OF LUMBOSACRAL REGION WITH DISCOGENIC BACK PAIN: ICD-10-CM

## 2024-10-14 DIAGNOSIS — G89.29 CHRONIC MIDLINE LOW BACK PAIN WITHOUT SCIATICA: ICD-10-CM

## 2024-10-14 LAB
ANION GAP SERPL CALCULATED.3IONS-SCNC: 8 MMOL/L (ref 7–15)
BUN SERPL-MCNC: 19.1 MG/DL (ref 8–23)
CALCIUM SERPL-MCNC: 9.8 MG/DL (ref 8.8–10.4)
CHLORIDE SERPL-SCNC: 106 MMOL/L (ref 98–107)
CHOLEST SERPL-MCNC: 125 MG/DL
CREAT SERPL-MCNC: 1.29 MG/DL (ref 0.67–1.17)
EGFRCR SERPLBLD CKD-EPI 2021: 56 ML/MIN/1.73M2
FASTING STATUS PATIENT QL REPORTED: YES
FASTING STATUS PATIENT QL REPORTED: YES
GLUCOSE SERPL-MCNC: 108 MG/DL (ref 70–99)
HCO3 SERPL-SCNC: 27 MMOL/L (ref 22–29)
HDLC SERPL-MCNC: 58 MG/DL
LDLC SERPL CALC-MCNC: 46 MG/DL
NONHDLC SERPL-MCNC: 67 MG/DL
POTASSIUM SERPL-SCNC: 4.6 MMOL/L (ref 3.4–5.3)
SODIUM SERPL-SCNC: 141 MMOL/L (ref 135–145)
TRIGL SERPL-MCNC: 107 MG/DL

## 2024-10-14 PROCEDURE — 90471 IMMUNIZATION ADMIN: CPT | Performed by: PHYSICIAN ASSISTANT

## 2024-10-14 PROCEDURE — 99214 OFFICE O/P EST MOD 30 MIN: CPT | Mod: 25 | Performed by: PHYSICIAN ASSISTANT

## 2024-10-14 PROCEDURE — 99397 PER PM REEVAL EST PAT 65+ YR: CPT | Mod: 25 | Performed by: PHYSICIAN ASSISTANT

## 2024-10-14 PROCEDURE — 90662 IIV NO PRSV INCREASED AG IM: CPT | Performed by: PHYSICIAN ASSISTANT

## 2024-10-14 PROCEDURE — 36415 COLL VENOUS BLD VENIPUNCTURE: CPT | Performed by: PHYSICIAN ASSISTANT

## 2024-10-14 PROCEDURE — 80061 LIPID PANEL: CPT | Performed by: PHYSICIAN ASSISTANT

## 2024-10-14 PROCEDURE — 80048 BASIC METABOLIC PNL TOTAL CA: CPT | Performed by: PHYSICIAN ASSISTANT

## 2024-10-14 RX ORDER — LOSARTAN POTASSIUM 50 MG/1
50 TABLET ORAL DAILY
Qty: 90 TABLET | Refills: 2 | Status: SHIPPED | OUTPATIENT
Start: 2024-10-14

## 2024-10-14 RX ORDER — GABAPENTIN 300 MG/1
300 CAPSULE ORAL AT BEDTIME
Qty: 90 CAPSULE | Refills: 2 | Status: SHIPPED | OUTPATIENT
Start: 2024-10-14

## 2024-10-14 RX ORDER — SILDENAFIL 100 MG/1
50-100 TABLET, FILM COATED ORAL DAILY PRN
Qty: 30 TABLET | Refills: 1 | Status: SHIPPED | OUTPATIENT
Start: 2024-10-14

## 2024-10-14 RX ORDER — GABAPENTIN 100 MG/1
100 CAPSULE ORAL 2 TIMES DAILY
Qty: 180 CAPSULE | Refills: 1 | Status: SHIPPED | OUTPATIENT
Start: 2024-10-14

## 2024-10-14 ASSESSMENT — PAIN SCALES - GENERAL: PAINLEVEL: NO PAIN (0)

## 2024-10-14 NOTE — PATIENT INSTRUCTIONS
Patient Education   Preventive Care Advice   This is general advice given by our system to help you stay healthy. However, your care team may have specific advice just for you. Please talk to your care team about your preventive care needs.  Nutrition  Eat 5 or more servings of fruits and vegetables each day.  Try wheat bread, brown rice and whole grain pasta (instead of white bread, rice, and pasta).  Get enough calcium and vitamin D. Check the label on foods and aim for 100% of the RDA (recommended daily allowance).  Lifestyle  Exercise at least 150 minutes each week  (30 minutes a day, 5 days a week).  Do muscle strengthening activities 2 days a week. These help control your weight and prevent disease.  No smoking.  Wear sunscreen to prevent skin cancer.  Have a dental exam and cleaning every 6 months.  Yearly exams  See your health care team every year to talk about:  Any changes in your health.  Any medicines your care team has prescribed.  Preventive care, family planning, and ways to prevent chronic diseases.  Shots (vaccines)   HPV shots (up to age 26), if you've never had them before.  Hepatitis B shots (up to age 59), if you've never had them before.  COVID-19 shot: Get this shot when it's due.  Flu shot: Get a flu shot every year.  Tetanus shot: Get a tetanus shot every 10 years.  Pneumococcal, hepatitis A, and RSV shots: Ask your care team if you need these based on your risk.  Shingles shot (for age 50 and up)  General health tests  Diabetes screening:  Starting at age 35, Get screened for diabetes at least every 3 years.  If you are younger than age 35, ask your care team if you should be screened for diabetes.  Cholesterol test: At age 39, start having a cholesterol test every 5 years, or more often if advised.  Bone density scan (DEXA): At age 50, ask your care team if you should have this scan for osteoporosis (brittle bones).  Hepatitis C: Get tested at least once in your life.  STIs (sexually  transmitted infections)  Before age 24: Ask your care team if you should be screened for STIs.  After age 24: Get screened for STIs if you're at risk. You are at risk for STIs (including HIV) if:  You are sexually active with more than one person.  You don't use condoms every time.  You or a partner was diagnosed with a sexually transmitted infection.  If you are at risk for HIV, ask about PrEP medicine to prevent HIV.  Get tested for HIV at least once in your life, whether you are at risk for HIV or not.  Cancer screening tests  Cervical cancer screening: If you have a cervix, begin getting regular cervical cancer screening tests starting at age 21.  Breast cancer scan (mammogram): If you've ever had breasts, begin having regular mammograms starting at age 40. This is a scan to check for breast cancer.  Colon cancer screening: It is important to start screening for colon cancer at age 45.  Have a colonoscopy test every 10 years (or more often if you're at risk) Or, ask your provider about stool tests like a FIT test every year or Cologuard test every 3 years.  To learn more about your testing options, visit:   .  For help making a decision, visit:   https://bit.ly/aq41980.  Prostate cancer screening test: If you have a prostate, ask your care team if a prostate cancer screening test (PSA) at age 55 is right for you.  Lung cancer screening: If you are a current or former smoker ages 50 to 80, ask your care team if ongoing lung cancer screenings are right for you.  For informational purposes only. Not to replace the advice of your health care provider. Copyright   2023 [facility]. All rights reserved. Clinically reviewed by the [facility] Transitions Program. [employer] 112589 - REV 01/24.  Preventing Falls: Care Instructions  Injuries and health problems such as trouble walking or poor eyesight can increase your risk of falling. So can some medicines. But there are things you can do to help  "prevent falls. You can exercise to get stronger. You can also arrange your home to make it safer.    Talk to your doctor about the medicines you take. Ask if any of them increase the risk of falls and whether they can be changed or stopped.   Try to exercise regularly. It can help improve your strength and balance. This can help lower your risk of falling.         Practice fall safety and prevention.   Wear low-heeled shoes that fit well and give your feet good support. Talk to your doctor if you have foot problems that make this hard.  Carry a cellphone or wear a medical alert device that you can use to call for help.  Use stepladders instead of chairs to reach high objects. Don't climb if you're at risk for falls. Ask for help, if needed.  Wear the correct eyeglasses, if you need them.        Make your home safer.   Remove rugs, cords, clutter, and furniture from walkways.  Keep your house well lit. Use night-lights in hallways and bathrooms.  Install and use sturdy handrails on stairways.  Wear nonskid footwear, even inside. Don't walk barefoot or in socks without shoes.        Be safe outside.   Use handrails, curb cuts, and ramps whenever possible.  Keep your hands free by using a shoulder bag or backpack.  Try to walk in well-lit areas. Watch out for uneven ground, changes in pavement, and debris.  Be careful in the winter. Walk on the grass or gravel when sidewalks are slippery. Use de-icer on steps and walkways. Add non-slip devices to shoes.    Put grab bars and nonskid mats in your shower or tub and near the toilet. Try to use a shower chair or bath bench when bathing.   Get into a tub or shower by putting in your weaker leg first. Get out with your strong side first. Have a phone or medical alert device in the bathroom with you.   Where can you learn more?  Go to https://www.[employer]an[employer]wise.net/patiented  Enter G117 in the search box to learn more about \"Preventing Falls: Care Instructions.\"  Current as of: " July 17, 2023  Content Version: 14.2 2024 [facility] [employer].   Care instructions adapted under license by your healthcare professional. If you have questions about a medical condition or this instruction, always ask your healthcare professional. [employer] disclaims any warranty or liability for your use of this information.

## 2024-10-14 NOTE — PROGRESS NOTES
Preventive Care Visit  [facility] REYNA Resendiz PA-C, Family Medicine  Oct 14, 2024      Assessment & Plan     Encounter for Medicare annual wellness exam  Reviewed personal and family history. Reviewed age appropriate screenings. Recommended any needed vaccinations.    Essential hypertension  Controlled on recheck. Continue present management.   - BASIC METABOLIC PANEL; Future  - losartan (COZAAR) 50 MG tablet; Take 1 tablet (50 mg) by mouth daily.    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 Continue present management. He continues to do exercises to keep these in check   - gabapentin (NEURONTIN) 300 MG capsule; Take 1 capsule (300 mg) by mouth at bedtime.  - gabapentin (NEURONTIN) 100 MG capsule; Take 1 capsule (100 mg) by mouth 2 times daily.    Erectile dysfunction, unspecified erectile dysfunction type  New dx, old problem. Discussed r/b/se of treatment.   - sildenafil (VIAGRA) 100 MG tablet; Take 0.5-1 tablets ( mg) by mouth daily as needed. 30 minutes to 4 hours prior to sexual activity    History of CVA (cerebrovascular accident)  Hyperlipidemia LDL goal <70  Continue risk factor reduction. On asa81 as well  - Lipid panel reflex to direct LDL Fasting; Future    Need for vaccination against respiratory syncytial virus  Will return for this    Sensorineural hearing loss (SNHL) of left ear, unspecified hearing status on contralateral side  Planning to get aid thru [employer]      Counseling  Appropriate preventive services were addressed with this patient via screening, questionnaire, or discussion as appropriate for fall prevention, nutrition, physical activity, Tobacco-use cessation, social engagement, weight loss and cognition.  Checklist reviewing preventive services available has been given to the patient.  Reviewed patient's diet, addressing concerns  and/or questions.   He is at risk for lack of exercise and has been provided with information to increase physical activity for the benefit of his well-being.   The patient was instructed to see the dentist every 6 months.           Santiago   Chris is a 79 year old, presenting for the following:  Physical    Tommie MARY Kendrick is a 79 year old male who presents today for annual wellness  Overall does feel like he has stayed healthy  Gabapentin controlling his back   -remains active     Starting to notice some ED issues gradually over the past few years  More difficult to achieve an erection; if he does it is not at hard as prior          10/14/2024     8:16 AM   Additional Questions   Roomed by jovanni     Health Care Directive  Patient does not have a Health Care Directive or Living Will: Patient states has Advance Directive and will bring in a copy to clinic.    HPI        10/9/2024   General Health   How would you rate your overall physical health? Good   Feel stress (tense, anxious, or unable to sleep) Not at all            10/9/2024   Nutrition   Diet: Regular (no restrictions)            10/9/2024   Exercise   Days per week of moderate/strenous exercise 3 days   Average minutes spent exercising at this level 60 min            10/9/2024   Social Factors   Frequency of gathering with friends or relatives Once a week   Worry food won't last until get money to buy more No   Food not last or not have enough money for food? No   Do you have housing? (Housing is defined as stable permanent housing and does not include staying ouside in a car, in a tent, in an abandoned building, in an overnight shelter, or couch-surfing.) Yes   Are you worried about losing your housing? No   Lack of transportation? No   Unable to get utilities (heat,electricity)? No            10/14/2024   Fall Risk   Gait Speed Test (Document in seconds) 5   Gait Speed Test Interpretation Less than or equal to 5.00 seconds - PASS             10/9/2024    Activities of Daily Living- Home Safety   Needs help with the following daily activites None of the above   Safety concerns in the home None of the above            10/9/2024   Dental   Dentist two times every year? (!) NO            10/9/2024   Hearing Screening   Hearing concerns? None of the above            10/9/2024   Driving Risk Screening   Patient/family members have concerns about driving No            10/9/2024   General Alertness/Fatigue Screening   Have you been more tired than usual lately? No            10/9/2024   Urinary Incontinence Screening   Bothered by leaking urine in past 6 months No            10/9/2024   TB Screening   Were you born outside of the [place]? No            Today's PHQ-2 Score:       10/13/2024    10:30 AM   PHQ-2 (  [employer])   Q1: Little interest or pleasure in doing things 0   Q2: Feeling down, depressed or hopeless 0   PHQ-2 Score 0   Q1: Little interest or pleasure in doing things Not at all   Q2: Feeling down, depressed or hopeless Not at all   PHQ-2 Score 0           10/9/2024   Substance Use   Alcohol more than 3/day or more than 7/wk No   Do you have a current opioid prescription? No   How severe/bad is pain from 1 to 10? 5/10   Do you use any other substances recreationally? No        Social History     Tobacco Use    Smoking status: Former     Current packs/day: 0.00     Types: Cigarettes     Start date: 10/28/1972     Quit date: 10/28/1980     Years since quittin.9     Passive exposure: Never    Smokeless tobacco: Never    Tobacco comments:     2 ppweek   Vaping Use    Vaping status: Never Used   Substance Use Topics    Alcohol use: Yes     Alcohol/week: 0.0 - 0.8 standard drinks of alcohol     Comment: occa    Drug use: No                 Reviewed and updated as needed this visit by Provider                    Lab work is in process  Labs reviewed in EPIC  Current providers sharing in care for this patient include:  Patient Care Team:  Christiano Resendiz,  "JESENIA as PCP - General (Physician Assistant)  Christiano Resendiz PA-C as Assigned PCP    The following health maintenance items are reviewed in Epic and correct as of today:  Health Maintenance   Topic Date Due    ZOSTER IMMUNIZATION (2 of 3) 12/17/2015    RSV VACCINE (1 - 1-dose 75+ series) Never done    INFLUENZA VACCINE (1) 09/01/2024    COVID-19 Vaccine (6 - 2024-25 season) 09/01/2024    LIPID  10/02/2024    MEDICARE ANNUAL WELLNESS VISIT  10/02/2024    BMP  10/17/2024    ANNUAL REVIEW OF HM ORDERS  10/14/2025    FALL RISK ASSESSMENT  10/14/2025    GLUCOSE  10/17/2026    DTAP/TDAP/TD IMMUNIZATION (3 - Td or Tdap) 12/15/2026    ADVANCE CARE PLANNING  10/14/2029    PHQ-2 (once per calendar year)  Completed    Pneumococcal Vaccine: 65+ Years  Completed    HPV IMMUNIZATION  Aged Out    MENINGITIS IMMUNIZATION  Aged Out    RSV MONOCLONAL ANTIBODY  Aged Out    COLORECTAL CANCER SCREENING  Discontinued    LUNG CANCER SCREENING  Discontinued         Review of Systems  Constitutional, HEENT, cardiovascular, pulmonary, gi and gu systems are negative, except as otherwise noted.     Objective    Exam  BP (!) 151/66   Pulse 59   Temp 97.5  F (36.4  C)   Resp 14   Ht 1.651 m (5' 5\")   Wt 64 kg (141 lb)   SpO2 98%   BMI 23.46 kg/m     Estimated body mass index is 23.46 kg/m  as calculated from the following:    Height as of this encounter: 1.651 m (5' 5\").    Weight as of this encounter: 64 kg (141 lb).    Physical Exam  GENERAL: alert and no distress  EYES: Eyes grossly normal to inspection, PERRL and conjunctivae and sclerae normal  HENT: ear canals and TM's normal, nose and mouth without ulcers or lesions  NECK: no adenopathy, no asymmetry, masses, or scars  RESP: lungs clear to auscultation - no rales, rhonchi or wheezes  CV: regular rate and rhythm, normal S1 S2, no S3 or S4, no murmur, click or rub, no peripheral edema  ABDOMEN: soft, nontender, no hepatosplenomegaly, no masses and bowel sounds normal  MS: no " gross musculoskeletal defects noted, no edema  SKIN: no suspicious lesions or rashes  PSYCH: mentation appears normal, affect normal/bright         10/14/2024   Mini Cog   Clock Draw Score 2 Normal   3 Item Recall 2 objects recalled   Mini Cog Total Score 4                 Signed Electronically by: Christiano Resendiz PA-C

## 2024-11-20 ENCOUNTER — MYC REFILL (OUTPATIENT)
Dept: FAMILY MEDICINE | Facility: CLINIC | Age: 79
End: 2024-11-20
Payer: COMMERCIAL

## 2024-11-20 DIAGNOSIS — M51.370 DEGENERATION OF INTERVERTEBRAL DISC OF LUMBOSACRAL REGION WITH DISCOGENIC BACK PAIN: ICD-10-CM

## 2024-11-20 DIAGNOSIS — G89.29 CHRONIC MIDLINE LOW BACK PAIN WITHOUT SCIATICA: ICD-10-CM

## 2024-11-20 DIAGNOSIS — M54.50 CHRONIC MIDLINE LOW BACK PAIN WITHOUT SCIATICA: ICD-10-CM

## 2024-11-20 RX ORDER — GABAPENTIN 100 MG/1
100 CAPSULE ORAL 2 TIMES DAILY
Qty: 180 CAPSULE | Refills: 1 | Status: SHIPPED | OUTPATIENT
Start: 2024-11-20

## 2025-01-18 ENCOUNTER — OFFICE VISIT (OUTPATIENT)
Dept: URGENT CARE | Facility: URGENT CARE | Age: 80
End: 2025-01-18
Payer: COMMERCIAL

## 2025-01-18 VITALS
BODY MASS INDEX: 23.46 KG/M2 | SYSTOLIC BLOOD PRESSURE: 124 MMHG | WEIGHT: 141 LBS | HEART RATE: 69 BPM | DIASTOLIC BLOOD PRESSURE: 68 MMHG | OXYGEN SATURATION: 97 % | TEMPERATURE: 97 F | RESPIRATION RATE: 18 BRPM

## 2025-01-18 DIAGNOSIS — T78.40XA ALLERGIC REACTION, INITIAL ENCOUNTER: ICD-10-CM

## 2025-01-18 DIAGNOSIS — L50.9 HIVES: Primary | ICD-10-CM

## 2025-01-18 PROCEDURE — 99213 OFFICE O/P EST LOW 20 MIN: CPT | Performed by: FAMILY MEDICINE

## 2025-01-18 RX ORDER — PREDNISONE 20 MG/1
TABLET ORAL
Qty: 18 TABLET | Refills: 0 | Status: SHIPPED | OUTPATIENT
Start: 2025-01-18

## 2025-01-18 NOTE — PROGRESS NOTES
Chief Complaint   Patient presents with    Urgent Care     Pt states he has had hive since Sunday.He has tried benadryl and zyrtec no relief.   Chris was seen today for urgent care.    Diagnoses and all orders for this visit:    Hives    Allergic reaction, initial encounter  -     predniSONE (DELTASONE) 20 MG tablet; Take 3 tabs by mouth daily x 2 days, then 2 tabs daily x 3 days, then 1 tab daily x 3 days, then 1/2 tab daily x 3 days.      PLAN:  Discussed triggers for urticaria including medications, viral illness (most common).  Discussed they may wax/wane for several weeks without re-exposure to trigger.  Appropriate doses for benadryl, claritin, zyrtec discussed.  Follow up prn worsening sx, mucus membrane involvement or respiratory sx or failure to improve.  Because of extensive hives noted in the duration we will consider treating with a course of prednisone        SUBJECTIVE:  Tommie Kendrick is a 79 year old male with a chief complaint of encephalitis which started 6 days back patient has tried Benadryl and Zyrtec there was very minimal improvement not sure what has caused the symptoms.  Denies any new food intake new medication new detergent new soap patient has significant allergy to MSG but not sure whether he had any food which had that.  Currently has no throat tightness no shortness of breath denies any GI symptoms.  Has no fever or chills.     Past Medical History:   Diagnosis Date    ASD (atrial septal defect), sinus venosus defect     Stroke (H)     thalamic    Thoracic or lumbosacral neuritis or radiculitis, unspecified 09/28/2007     Current Outpatient Medications   Medication Sig Dispense Refill    acetaminophen (TYLENOL) 650 MG CR tablet Take 650 mg by mouth every 8 hours as needed      aspirin (ASPIRIN LOW DOSE) 81 MG EC tablet TAKE 1 TABLET BY MOUTH EVERY DAY 90 tablet 1    atorvastatin (LIPITOR) 10 MG tablet TAKE 1 TABLET (10 MG) BY MOUTH DAILY. 90 tablet 3    gabapentin (NEURONTIN) 100 MG  capsule Take 1 capsule (100 mg) by mouth 2 times daily. 180 capsule 1    gabapentin (NEURONTIN) 300 MG capsule Take 1 capsule (300 mg) by mouth at bedtime. 90 capsule 2    latanoprost (XALATAN) 0.005 % ophthalmic solution Place 1 drop into both eyes daily      losartan (COZAAR) 50 MG tablet Take 1 tablet (50 mg) by mouth daily. 90 tablet 2    sildenafil (VIAGRA) 100 MG tablet Take 0.5-1 tablets ( mg) by mouth daily as needed. 30 minutes to 4 hours prior to sexual activity 30 tablet 1     Social History     Tobacco Use    Smoking status: Former     Current packs/day: 0.00     Types: Cigarettes     Start date: 10/28/1972     Quit date: 10/28/1980     Years since quittin.2     Passive exposure: Never    Smokeless tobacco: Never    Tobacco comments:     2 ppweek   Substance Use Topics    Alcohol use: Yes     Alcohol/week: 0.0 - 0.8 standard drinks of alcohol     Comment: occa       ROS:  Review of systems negative except as stated above.    OBJECTIVE:   /68   Pulse 69   Temp 97  F (36.1  C) (Tympanic)   Resp 18   Wt 64 kg (141 lb)   SpO2 97%   BMI 23.46 kg/m    GENERAL APPEARANCE: healthy, alert and no distress  EYES: EOMI,  PERRL, conjunctiva clear  HENT: ear canals and TM's normal.  Nose normal.  Pharynx no erythema noted.  RESP: lungs clear to auscultation - no rales, rhonchi or wheezes  CV: regular rates and rhythm, normal S1 S2, no murmur noted  SKIN: Erythematous raised maculopapular rash noted over the face scalp neck area chest bilateral arms excluding the palms upper thigh and groin area  PSYCH: mentation appears normal    Michelle Mcmanus MD

## 2025-01-18 NOTE — PATIENT INSTRUCTIONS
You  were seen in the clinic for hives which is possibly from continue doing the Zyrtec daily also apply allergic reaction  Oatmeal-based lotion/calamine lotion to help with the itching  Complete the course of the prednisone.  If symptoms still do not improve to follow-up  If you notice Symptoms of fever, throat tightness, shortness of breath, do follow-up      Michelle Mcmanus MD

## 2025-01-21 ENCOUNTER — MYC MEDICAL ADVICE (OUTPATIENT)
Dept: FAMILY MEDICINE | Facility: CLINIC | Age: 80
End: 2025-01-21
Payer: COMMERCIAL

## 2025-01-21 ENCOUNTER — OFFICE VISIT (OUTPATIENT)
Dept: FAMILY MEDICINE | Facility: CLINIC | Age: 80
End: 2025-01-21
Payer: COMMERCIAL

## 2025-01-21 ENCOUNTER — TELEPHONE (OUTPATIENT)
Dept: FAMILY MEDICINE | Facility: CLINIC | Age: 80
End: 2025-01-21

## 2025-01-21 VITALS
SYSTOLIC BLOOD PRESSURE: 162 MMHG | DIASTOLIC BLOOD PRESSURE: 72 MMHG | HEART RATE: 68 BPM | BODY MASS INDEX: 23.82 KG/M2 | HEIGHT: 65 IN | RESPIRATION RATE: 12 BRPM | TEMPERATURE: 98 F | OXYGEN SATURATION: 99 % | WEIGHT: 143 LBS

## 2025-01-21 DIAGNOSIS — L50.9 HIVES: Primary | ICD-10-CM

## 2025-01-21 PROCEDURE — 99213 OFFICE O/P EST LOW 20 MIN: CPT | Performed by: FAMILY MEDICINE

## 2025-01-21 RX ORDER — PREDNISONE 20 MG/1
TABLET ORAL
Qty: 25 TABLET | Refills: 0 | Status: SHIPPED | OUTPATIENT
Start: 2025-01-21

## 2025-01-21 RX ORDER — FAMOTIDINE 20 MG/1
20 TABLET, FILM COATED ORAL DAILY
Qty: 30 TABLET | Refills: 0 | Status: SHIPPED | OUTPATIENT
Start: 2025-01-21

## 2025-01-21 RX ORDER — FEXOFENADINE HCL 180 MG/1
180 TABLET ORAL DAILY
Qty: 30 TABLET | Refills: 0 | Status: SHIPPED | OUTPATIENT
Start: 2025-01-21

## 2025-01-21 NOTE — TELEPHONE ENCOUNTER
Pt calls stating Dr Bomwan wrote another Prednisone taper prescription for him and wants him to start increasing it today, but since he is still on the other taper medication from [facility], his insurance is not approving this Rx.   He says if he increases the dose today from his first prescription he will run out 3 days early, before insurance will allow him to pick it up.   He is supposed to increase to 60 mg prednisone today, for 4 days, he will run out of pills in 2 days, 1/23. Insurance will not let him get new rx until 1/26.     Advised pt that Prednisone is fairly inexpensive, probably around $10 for #25 pills.   Looked up Good Rx and this should be approx cost.     Pt agrees with this and will check with pharmacy to find out about cash brown.   Advised pt to call Clinic back if price ends up being too much. He verbalized understanding.

## 2025-01-21 NOTE — TELEPHONE ENCOUNTER
"Per chart review pt was seen 3 days ago at . Per notes the recommendation was:    \"Discussed triggers for urticaria including medications, viral illness (most common). Discussed they may wax/wane for several weeks without re-exposure to trigger. Appropriate doses for benadryl, claritin, zyrtec discussed. Follow up prn worsening sx, mucus membrane involvement or respiratory sx or failure to improve. Because of extensive hives noted in the duration we will consider treating with a course of prednisone \"    Advised through [employer] message to call triage line if symptoms are worsening.    Viji Salinas RN on 1/21/2025 at 10:16 AM    "

## 2025-01-21 NOTE — PROGRESS NOTES
"  Assessment & Plan   Patient mention he thinks he is allergic to MSG .  On Friday he has chicken from the [place] , by saturday he was covered in the Hives ,   He tried Prednisone with some response , continue to have itching all over the body .    Multiple macular rash chest , arms and legs   We discussed etiology in detail   We discussed management in detail .  We discussed allergy testing to determine exact cause as well ,   He will contact if no improvement with medication we can consider skin biopsy    (L50.9) Hives  (primary encounter diagnosis)  Comment: Patient is in the clinic for urgent care follow up .      Plan: fexofenadine (ALLEGRA) 180 MG tablet,         famotidine (PEPCID) 20 MG tablet, predniSONE         (DELTASONE) 20 MG tablet, Adult Allergy/Asthma          Referral                  Subjective   Chirs is a 79 year old, presenting for the following health issues:    Follow Up (Urgent Care ) and Hives        1/21/2025     2:13 PM   Additional Questions   Roomed by Sofía ZAMORA     History of Present Illness       Reason for visit:  Hives   He is taking medications regularly.         ED/ Followup:    Facility:  [facility]  Date of visit: 01/18/2025  Reason for visit: Hives  Current Status: Hives are getting worse         Review of Systems  CONSTITUTIONAL: NEGATIVE for fever, chills, change in weight  ENT/MOUTH: NEGATIVE for ear, mouth and throat problems  RESP: NEGATIVE for significant cough or SOB  CV: NEGATIVE for chest pain, palpitations or peripheral edema      Objective    BP (!) 162/72 (Cuff Size: Adult Regular)   Pulse 68   Temp 98  F (36.7  C)   Resp 12   Ht 1.651 m (5' 5\")   Wt 64.9 kg (143 lb)   SpO2 99%   BMI 23.80 kg/m    Body mass index is 23.8 kg/m .  Physical Exam   GENERAL: alert and no distress  CV: regular rate and rhythm, normal S1 S2, no S3 or S4, no murmur, click or rub, no peripheral edema  ABDOMEN: soft, nontender, no hepatosplenomegaly, no " masses and bowel sounds normal  MS: no gross musculoskeletal defects noted, no edema  SKIN: macular rash chest , abdomen arms and legs .  NEURO: Normal strength and tone, mentation intact and speech normal  PSYCH: mentation appears normal, affect normal/bright          Signed Electronically by: Deyanira Bowman MD

## 2025-01-22 ENCOUNTER — TELEPHONE (OUTPATIENT)
Dept: ALLERGY | Facility: CLINIC | Age: 80
End: 2025-01-22
Payer: COMMERCIAL

## 2025-01-22 NOTE — TELEPHONE ENCOUNTER
This encounter is being sent to inform the clinic that this patient has a referral from Deyanira Bowman MD in [facility] for the diagnoses of Hives - Pt said he is broke out all over and has requested that this patient be seen within Priority: 1-2 Weeks and/or with Priority: 1-2 Weeks .  Based on the availability of our provider(s), we are unable to accommodate this request.    Were all sites offered this patient?  Yes  Prefers [facility]  Does scheduling algorithm request to schedule next available?  Patient has been scheduled for the first available opening with Dr Jones on 05/05/25.  We have informed the patient that the clinic will review their referral and reach out if a sooner appointment is medically necessary.

## 2025-01-23 NOTE — TELEPHONE ENCOUNTER
RN spoke with patient and asked patient what he was looking to be seen for and he firmly believes he is allergic to MSG. RN let him know that Dr. Jones is unable to test for MSG but if he has other food allergies or environmental allergy concerns we would be happy to see him. Patient stated that that was his only concern and we could go ahead and cancel his appointment and he thanked us for letting him know. RN cancelling appt currently scheduled on 05/05/2025 with Dr. Jones.    Tamra Larios RN

## 2025-01-24 ENCOUNTER — TELEPHONE (OUTPATIENT)
Dept: DERMATOLOGY | Facility: CLINIC | Age: 80
End: 2025-01-24

## 2025-01-24 ENCOUNTER — HOSPITAL ENCOUNTER (EMERGENCY)
Facility: CLINIC | Age: 80
Discharge: HOME | End: 2025-01-24
Attending: PHYSICIAN ASSISTANT | Admitting: PHYSICIAN ASSISTANT
Payer: COMMERCIAL

## 2025-01-24 VITALS
TEMPERATURE: 97.5 F | DIASTOLIC BLOOD PRESSURE: 88 MMHG | WEIGHT: 141.09 LBS | HEART RATE: 87 BPM | RESPIRATION RATE: 18 BRPM | HEIGHT: 65 IN | SYSTOLIC BLOOD PRESSURE: 133 MMHG | BODY MASS INDEX: 23.51 KG/M2 | OXYGEN SATURATION: 97 %

## 2025-01-24 DIAGNOSIS — L30.9 DERMATITIS: ICD-10-CM

## 2025-01-24 DIAGNOSIS — N18.9 CHRONIC RENAL IMPAIRMENT, UNSPECIFIED CKD STAGE: ICD-10-CM

## 2025-01-24 DIAGNOSIS — R21 RASH AND NONSPECIFIC SKIN ERUPTION: ICD-10-CM

## 2025-01-24 LAB
ALBUMIN SERPL BCG-MCNC: 3.8 G/DL (ref 3.5–5.2)
ALP SERPL-CCNC: 80 U/L (ref 40–150)
ALT SERPL W P-5'-P-CCNC: 48 U/L (ref 0–70)
ANION GAP SERPL CALCULATED.3IONS-SCNC: 9 MMOL/L (ref 7–15)
AST SERPL W P-5'-P-CCNC: 30 U/L (ref 0–45)
BASOPHILS # BLD AUTO: 0 10E3/UL (ref 0–0.2)
BASOPHILS NFR BLD AUTO: 0 %
BILIRUB SERPL-MCNC: 0.8 MG/DL
BUN SERPL-MCNC: 28.1 MG/DL (ref 8–23)
CALCIUM SERPL-MCNC: 9.9 MG/DL (ref 8.8–10.4)
CHLORIDE SERPL-SCNC: 102 MMOL/L (ref 98–107)
CREAT SERPL-MCNC: 1.49 MG/DL (ref 0.67–1.17)
EGFRCR SERPLBLD CKD-EPI 2021: 47 ML/MIN/1.73M2
EOSINOPHIL # BLD AUTO: 1.9 10E3/UL (ref 0–0.7)
EOSINOPHIL NFR BLD AUTO: 14 %
ERYTHROCYTE [DISTWIDTH] IN BLOOD BY AUTOMATED COUNT: 12.7 % (ref 10–15)
GLUCOSE SERPL-MCNC: 102 MG/DL (ref 70–99)
HCO3 SERPL-SCNC: 29 MMOL/L (ref 22–29)
HCT VFR BLD AUTO: 45.3 % (ref 40–53)
HGB BLD-MCNC: 14.9 G/DL (ref 13.3–17.7)
IMM GRANULOCYTES # BLD: 0.1 10E3/UL
IMM GRANULOCYTES NFR BLD: 1 %
LYMPHOCYTES # BLD AUTO: 1.8 10E3/UL (ref 0.8–5.3)
LYMPHOCYTES NFR BLD AUTO: 13 %
MCH RBC QN AUTO: 31.9 PG (ref 26.5–33)
MCHC RBC AUTO-ENTMCNC: 32.9 G/DL (ref 31.5–36.5)
MCV RBC AUTO: 97 FL (ref 78–100)
MONOCYTES # BLD AUTO: 1.2 10E3/UL (ref 0–1.3)
MONOCYTES NFR BLD AUTO: 9 %
NEUTROPHILS # BLD AUTO: 8.3 10E3/UL (ref 1.6–8.3)
NEUTROPHILS NFR BLD AUTO: 63 %
NRBC # BLD AUTO: 0 10E3/UL
NRBC BLD AUTO-RTO: 0 /100
PLATELET # BLD AUTO: 322 10E3/UL (ref 150–450)
POTASSIUM SERPL-SCNC: 4 MMOL/L (ref 3.4–5.3)
PROT SERPL-MCNC: 6.2 G/DL (ref 6.4–8.3)
RBC # BLD AUTO: 4.67 10E6/UL (ref 4.4–5.9)
SODIUM SERPL-SCNC: 140 MMOL/L (ref 135–145)
WBC # BLD AUTO: 13.2 10E3/UL (ref 4–11)

## 2025-01-24 PROCEDURE — 85025 COMPLETE CBC W/AUTO DIFF WBC: CPT | Performed by: PHYSICIAN ASSISTANT

## 2025-01-24 PROCEDURE — 250N000011 HC RX IP 250 OP 636: Performed by: PHYSICIAN ASSISTANT

## 2025-01-24 PROCEDURE — 99285 EMERGENCY DEPT VISIT HI MDM: CPT | Mod: 25

## 2025-01-24 PROCEDURE — 82040 ASSAY OF SERUM ALBUMIN: CPT | Performed by: PHYSICIAN ASSISTANT

## 2025-01-24 PROCEDURE — 36415 COLL VENOUS BLD VENIPUNCTURE: CPT | Performed by: PHYSICIAN ASSISTANT

## 2025-01-24 PROCEDURE — 96372 THER/PROPH/DIAG INJ SC/IM: CPT | Performed by: PHYSICIAN ASSISTANT

## 2025-01-24 RX ORDER — EPINEPHRINE 1 MG/ML
0.3 INJECTION, SOLUTION INTRAMUSCULAR; SUBCUTANEOUS ONCE
Status: COMPLETED | OUTPATIENT
Start: 2025-01-24 | End: 2025-01-24

## 2025-01-24 RX ORDER — TRIAMCINOLONE ACETONIDE 1 MG/G
OINTMENT TOPICAL 2 TIMES DAILY
Qty: 30 G | Refills: 0 | Status: SHIPPED | OUTPATIENT
Start: 2025-01-24 | End: 2025-02-03

## 2025-01-24 RX ADMIN — EPINEPHRINE 0.3 MG: 1 INJECTION INTRAMUSCULAR; INTRAVENOUS; SUBCUTANEOUS at 10:12

## 2025-01-24 ASSESSMENT — ACTIVITIES OF DAILY LIVING (ADL)
ADLS_ACUITY_SCORE: 41
ADLS_ACUITY_SCORE: 41

## 2025-01-24 ASSESSMENT — COLUMBIA-SUICIDE SEVERITY RATING SCALE - C-SSRS
6. HAVE YOU EVER DONE ANYTHING, STARTED TO DO ANYTHING, OR PREPARED TO DO ANYTHING TO END YOUR LIFE?: NO
1. IN THE PAST MONTH, HAVE YOU WISHED YOU WERE DEAD OR WISHED YOU COULD GO TO SLEEP AND NOT WAKE UP?: NO
2. HAVE YOU ACTUALLY HAD ANY THOUGHTS OF KILLING YOURSELF IN THE PAST MONTH?: NO

## 2025-01-24 NOTE — TELEPHONE ENCOUNTER
This encounter is being sent to inform the clinic that this patient has a referral from NISA RAJAN for the diagnoses of L30.9 (ICD-10-CM) - Dermatitis  R21 (ICD-10-CM) - Rash and nonspecific skin eruption    and has requested that this patient be seen within 3-5 days.  Based on the availability of our provider(s), we are unable to accommodate this request.    Were all sites offered this patient?  Yes- pt would like only [facility] derm      Does scheduling algorithm request to schedule next available?  Patient has been scheduled for the first available opening with Pam Stokes on 07/30/2025.  We have informed the patient that the clinic will review their referral and reach out if a sooner appointment is medically necessary.

## 2025-01-24 NOTE — ED PROVIDER NOTES
Emergency Department Note      History of Present Illness     Chief Complaint   Rash      HPI   Tommie Kendrick is a 79 year old male with history of hyperlipidemia, stroke who presents to the ED with his wife for evaluation of a rash. Patient reports ongoing rash and pruritis for the past two weeks. The rash started in the groin, inner thigh area, and has grown to the neck and abdomen. Rash is red, dry and scaly. He also notes mild redness and swelling of arms. Patient has tried Benedryl and Zrytec with minimal improvement. He was seen at Urgent Care on 1/18/25 and 1/21/25, and advised to start Allegra, Pepcid, and Prednisone. Patient states the treatments have not helped, and the rash is spreading Denies new use of medication, soaps, detergents, or foods. Denies fever, vomiting, or mouth sores. No history of eczema. He notes he is allergic to food preservatives and his last reaction was 15-20 years ago. The past food preservative rash's only last 2-3 days and are relieved with benadryl. His wife does not have a rash. He is being referred for possible MSG testing.  No joint swelling.  No recent travel.  The rash is not blistering or peeling.  No urinary symptoms or abdominal pain or eye irritation.  He denies any recent antibiotics or new medication use.  He has been on gabapentin for years.    Independent Historian   None    Review of External Notes   I reviewed Dr. Plunkett, Urgent Care visit for Hives on 1/25/25, patient prescribed Prednisone taper, Pepcid and Allegra.     Past Medical History     Medical History and Problem List   ASD  Stroke  CVA  Hyperlipidemia  Spondylolisthesis  Choledocholithiasis    Medications   Atorvastatin  Famotidine  Fexofenadine  Gabapentin  Losartan  Prednisone    Surgical History   Tonsillectomy  Laparoscopic cholecystectomy  Right inguinal hernia repair  ASD repair with bypass    Physical Exam     Patient Vitals for the past 24 hrs:   BP Temp Temp src Pulse Resp SpO2 Height  "Weight   01/24/25 0813 133/88 97.5  F (36.4  C) Temporal 87 18 97 % 1.651 m (5' 5\") 64 kg (141 lb 1.5 oz)     Physical Exam  General: Awake, alert, non-toxic.  Head:  Scalp is NC/AT  Eyes:  Conjunctiva normal, PERRL  ENT:  The external nose and ears are normal.     Oropharynx clear, uvula midline.  No intraoral or mucosal lesions.  Phonating clearly handling secretions.  Neck:  Normal range of motion without rigidity.  CV:  Regular rate and rhythm    No pathologic murmur, rubs, or gallops.  Resp:  Breath sounds are clear bilaterally    Non-labored, no retractions or accessory muscle use  Abdomen: Abdomen is soft, no distension, no tenderness, no masses  MS:  No lower extremity edema/swelling.  Extremities without joint swelling or redness.  Skin:  Warm and dry.  There is diffuse rash as shown below which is erythematous involving the torso anteriorly as well as the proximal thighs and the flexural arms as well as the neck.  No vesicular lesions or blisters.  Negative Nikolsky sign no peeling.  Blanches normally.  Largely spares the back, scalp, as well as the palms and the soles.  Dry and flaky appearing especially on the flexural arms.  See images below.  Neuro:  Alert and oriented.  GCS 15 Moves all extremities normal.  No facial asymmetry. Gait normal.  Psych:  Awake. Alert. Normal affect. Appropriate interactions.                        Diagnostics     Lab Results   Labs Ordered and Resulted from Time of ED Arrival to Time of ED Departure   COMPREHENSIVE METABOLIC PANEL - Abnormal       Result Value    Sodium 140      Potassium 4.0      Carbon Dioxide (CO2) 29      Anion Gap 9      Urea Nitrogen 28.1 (*)     Creatinine 1.49 (*)     GFR Estimate 47 (*)     Calcium 9.9      Chloride 102      Glucose 102 (*)     Alkaline Phosphatase 80      AST 30      ALT 48      Protein Total 6.2 (*)     Albumin 3.8      Bilirubin Total 0.8     CBC WITH PLATELETS AND DIFFERENTIAL - Abnormal    WBC Count 13.2 (*)     RBC Count " 4.67      Hemoglobin 14.9      Hematocrit 45.3      MCV 97      MCH 31.9      MCHC 32.9      RDW 12.7      Platelet Count 322      % Neutrophils 63      % Lymphocytes 13      % Monocytes 9      % Eosinophils 14      % Basophils 0      % Immature Granulocytes 1      NRBCs per 100 WBC 0      Absolute Neutrophils 8.3      Absolute Lymphocytes 1.8      Absolute Monocytes 1.2      Absolute Eosinophils 1.9 (*)     Absolute Basophils 0.0      Absolute Immature Granulocytes 0.1      Absolute NRBCs 0.0         Imaging   No orders to display       Independent Interpretation   None    ED Course      Medications Administered   Medications   EPINEPHrine (Anaphylaxis) (ADRENALIN) injection (vial) 0.3 mg (0.3 mg Intramuscular $Given 1/24/25 1012)       Procedures   Procedures     Discussion of Management   None    ED Course   ED Course as of 01/24/25 1149   Fri Jan 24, 2025   0979 I obtained history and examined the patient as noted above.    1128 I rechecked and updated the patient. Patient does not feel better.        Additional Documentation  None    Medical Decision Making / Diagnosis     CMS Diagnoses: None    MIPS       None    MDM   Patient is 79 year old male/female who presents with rash.  Broad differential considered.  Based on presentation I feel this is still most consistent with a dermatitis/reaction to some trigger.  Pt non-toxic afebrile with unremarkable vitals.  No concerning systemic symptoms, mucosal lesions, petechia/purpura, blistering/[place] sign, lesions on palms/soles, severe pain, or other emergently concerning findings today.  Given already trying antihistmines and steroids did obtain labs which showed unremarkable cell lines, CKD mildly increased from prior though not consistent with MARY, normal bilirubin/LFTs. Mild WBC elevation likely 2/2 steroid use.  Mildly elevated eiosinophils though no other findings to suggest DRESS, no apparent drug trigger, no fever, LFT involvement, etc and I think this is  unlikely.  Very low suspicion for more serious cause such as: SJS/TEN, SSSS, Toxic shock syndrome, secondary syphillis.  No evidence of localized bacterial/viral/fungal infection such as cellulitis, Necrotizing infection, herpes/zoster, impetigo, tinea/candida. No evidence of generalized allergic reaction or anaphylaxis.    We did try a dose of epinephrine here which did not provide significant relief.  Much of the skin appears very dry and scaly and I think it is reasonable to trial application of emollients and topical steroids.  Will do triamcinolone instructed to avoid applying to skin folds or thinner areas.  Recommended Eucerin versus Vaseline for barrier as well.  Ultimately the patient would benefit from getting in to be evaluated by dermatology Derm not available for consult at our facility and no clear indication for emergent transfer/admission.  I will place a referral urgently for outpatient and also given contact information where he can attempt to set up follow-up.  Continue steroid taper and antihistamines with addition of topical as described.  I stressed that he should be reevaluated immediately if peeling or blistering of the skin, mucosal involvement, fever, systemic malaise or other concerns.      Disposition   The patient was discharged.     Diagnosis     ICD-10-CM    1. Dermatitis  L30.9 Adult Dermatology  Referral      2. Rash and nonspecific skin eruption  R21 Adult Dermatology  Referral      3. Chronic renal impairment, unspecified CKD stage  N18.9            Discharge Medications   New Prescriptions    TRIAMCINOLONE (KENALOG) 0.1 % EXTERNAL OINTMENT    Apply topically 2 times daily for 10 days. Do not apply to face or skin folds.       Scribe Disclosure:  Amita NICHOLSON, am serving as a scribe at 11:48 AM on 1/24/2025 to document services personally performed by Juventino Jerez, based on my observations and the provider's statements to me.        Juventino Jerez  JESENIA Nava  01/24/25 1349

## 2025-01-24 NOTE — ED TRIAGE NOTES
Pt presents with rash all over body that has been ongoing for the past 2 weeks. He has been seen by a provider and prescriped fexofenidine, prednisone, and famotidine with no relief. Rash is itchy and burns in some places. Pt states he is unable to sleep due to level of discomfort.      Triage Assessment (Adult)       Row Name 01/24/25 0811          Triage Assessment    Airway WDL WDL        Respiratory WDL    Respiratory WDL WDL        Skin Circulation/Temperature WDL    Skin Circulation/Temperature WDL WDL        Cardiac WDL    Cardiac WDL WDL        Peripheral/Neurovascular WDL    Peripheral Neurovascular WDL WDL        Cognitive/Neuro/Behavioral WDL    Cognitive/Neuro/Behavioral WDL WDL

## 2025-01-27 ENCOUNTER — LAB (OUTPATIENT)
Dept: LAB | Facility: CLINIC | Age: 80
End: 2025-01-27
Payer: COMMERCIAL

## 2025-01-27 ENCOUNTER — OFFICE VISIT (OUTPATIENT)
Dept: ALLERGY | Facility: CLINIC | Age: 80
End: 2025-01-27
Payer: COMMERCIAL

## 2025-01-27 VITALS
OXYGEN SATURATION: 97 % | SYSTOLIC BLOOD PRESSURE: 150 MMHG | HEART RATE: 81 BPM | WEIGHT: 142.3 LBS | BODY MASS INDEX: 23.68 KG/M2 | RESPIRATION RATE: 20 BRPM | DIASTOLIC BLOOD PRESSURE: 83 MMHG

## 2025-01-27 DIAGNOSIS — D72.10 EOSINOPHILIA, UNSPECIFIED TYPE: ICD-10-CM

## 2025-01-27 DIAGNOSIS — L29.9 ITCHING: ICD-10-CM

## 2025-01-27 DIAGNOSIS — D72.10 EOSINOPHILIA, UNSPECIFIED TYPE: Primary | ICD-10-CM

## 2025-01-27 DIAGNOSIS — R21 RASH: ICD-10-CM

## 2025-01-27 LAB
BASOPHILS # BLD AUTO: 0 10E3/UL (ref 0–0.2)
BASOPHILS NFR BLD AUTO: 0 %
EOSINOPHIL # BLD AUTO: 0.2 10E3/UL (ref 0–0.7)
EOSINOPHIL NFR BLD AUTO: 1 %
ERYTHROCYTE [DISTWIDTH] IN BLOOD BY AUTOMATED COUNT: 12.6 % (ref 10–15)
HCT VFR BLD AUTO: 42.8 % (ref 40–53)
HGB BLD-MCNC: 14.3 G/DL (ref 13.3–17.7)
IMM GRANULOCYTES # BLD: 0.1 10E3/UL
IMM GRANULOCYTES NFR BLD: 1 %
LYMPHOCYTES # BLD AUTO: 0.9 10E3/UL (ref 0.8–5.3)
LYMPHOCYTES NFR BLD AUTO: 6 %
MCH RBC QN AUTO: 32.4 PG (ref 26.5–33)
MCHC RBC AUTO-ENTMCNC: 33.4 G/DL (ref 31.5–36.5)
MCV RBC AUTO: 97 FL (ref 78–100)
MONOCYTES # BLD AUTO: 0.5 10E3/UL (ref 0–1.3)
MONOCYTES NFR BLD AUTO: 4 %
NEUTROPHILS # BLD AUTO: 12.5 10E3/UL (ref 1.6–8.3)
NEUTROPHILS NFR BLD AUTO: 88 %
PLATELET # BLD AUTO: 356 10E3/UL (ref 150–450)
RBC # BLD AUTO: 4.41 10E6/UL (ref 4.4–5.9)
WBC # BLD AUTO: 14.2 10E3/UL (ref 4–11)

## 2025-01-27 PROCEDURE — 86140 C-REACTIVE PROTEIN: CPT

## 2025-01-27 PROCEDURE — 36415 COLL VENOUS BLD VENIPUNCTURE: CPT

## 2025-01-27 PROCEDURE — 82550 ASSAY OF CK (CPK): CPT

## 2025-01-27 PROCEDURE — 99203 OFFICE O/P NEW LOW 30 MIN: CPT | Performed by: INTERNAL MEDICINE

## 2025-01-27 PROCEDURE — 85025 COMPLETE CBC W/AUTO DIFF WBC: CPT

## 2025-01-27 ASSESSMENT — PAIN SCALES - GENERAL: PAINLEVEL_OUTOF10: NO PAIN (0)

## 2025-01-27 NOTE — TELEPHONE ENCOUNTER
Patient Contact    Attempt # 1    Was call answered?  No    Left message on answering machine for patient to call back.    Analia VILLEDA RN  Dermatology   [phone]

## 2025-01-27 NOTE — PATIENT INSTRUCTIONS
Stop Fexofenadine  Pepcid 20 mg daily  Continue Prednisone  Start Zyrtec (Cetirizine) 20 mg twice daily   Check labs      Allergy Staff Appt Hours Shot Hours Location       Physician   Kev Jones MD      Support Staff   SYDNEY Sahu RN, MA Emily J., MA      Mondays Tuesdays Thursdays and Fridays:      Lorrie 7-5      Wednesdays         Close                Mondays, Tuesdays and Fridays:  7:20 - 3:40              [facility]  [address]  [place] [address]  Allergy appointment  line: [phone]    Pulmonary Function Scheduling:  [place]: [phone]           Questions about cost of your care  For questions about your cost of your visit, procedure, lab or imaging contact: [employer] Line [phone] or visit:  [email or web address]    Prescription Assistance  If you need assistance with your prescriptions (cost, coverage, etc) please contact: [employer] Prescription Assistance Program [phone]    Important Scheduling Information  All visits for food challenges, medication/drug allergy testing, and drug challenges MUST be scheduled through the allergy clinic nurse. Please contact them via [employer] or by calling the clinic at [phone] and asking to speak with an allergy nurse. They will provide additional information and instructions for the appointment. Discontinue oral antihistamines 7 days prior to the appointment. Discontinue nasal and ocular antihistamines 1 day prior to the appointment.    Appointments for skin testing: Appointment will last approximately 45 minutes.  Please call the appointment line for your clinic to schedule.  Discontinue oral antihistamines 7 days prior to the appointment.  Discontinue nasal and ocular antihistamines 1 days prior to appointment.    Thank you for trusting us with your care. Please feel free to contact us with any questions or concerns you may have.

## 2025-01-27 NOTE — PROGRESS NOTES
Tommie Kendrick was seen in the Allergy Clinic at [facility].    Tommie Kendrick is a 79 year old male being seen today at the request of Deyanira Bowman MD, [facility] in consultation for a rash.    Chris is here today for evaluation of a rash and concern for the cause being MSG.  He had Chinese food 5 times within about a week and he is wondering if that might of triggered the rash.  There is no other variables that are concerning.  There were no new medications for at least a year, recent vaccinations, or recent illnesses.  His medications have been stable.  He was seen on January 18, January 21, and January 24.  He was seen in the emergency department on the 24th.  The itching was severe.  Blood tests on the 24th reveal an eosinophil count of 1900.  He was likely on prednisone when the lab was ordered.  Prednisone was ordered both on the 18th and the 21st.  He was initially taking 60 mg daily and tapered down, and the taper changed on the 21st.  He is currently taking 40 mg of prednisone.  Triamcinolone was prescribed by the emergency department and he is not sure if this provided much benefit.  Additional labs included a complete metabolic panel and his creatinine was mildly elevated which has been elevated in the past.    The rash has been present for 2 weeks.  It started on his inner thighs and progressed to his arms and torso.  The most prominent areas right now is his hands and forearms.  It is gradually improving.  However the itch has been quite problematic and is what is driven him to seek medical attention on several occasions as described.  His wife is not itching.     Latest Reference Range & Units 01/24/25 09:53   Sodium 135 - 145 mmol/L 140   Potassium 3.4 - 5.3 mmol/L 4.0   Chloride 98 - 107 mmol/L 102   Carbon Dioxide (CO2) 22 - 29 mmol/L 29   Urea Nitrogen 8.0 - 23.0 mg/dL 28.1 (H)   Creatinine 0.67 - 1.17 mg/dL 1.49 (H)   GFR Estimate >60  mL/min/1.73m2 47 (L)   Calcium 8.8 - 10.4 mg/dL 9.9   Anion Gap 7 - 15 mmol/L 9   Albumin 3.5 - 5.2 g/dL 3.8   Protein Total 6.4 - 8.3 g/dL 6.2 (L)   Alkaline Phosphatase 40 - 150 U/L 80   ALT 0 - 70 U/L 48   AST 0 - 45 U/L 30   Bilirubin Total <=1.2 mg/dL 0.8   Glucose 70 - 99 mg/dL 102 (H)   WBC 4.0 - 11.0 10e3/uL 13.2 (H)   Hemoglobin 13.3 - 17.7 g/dL 14.9   Hematocrit 40.0 - 53.0 % 45.3   Platelet Count 150 - 450 10e3/uL 322   RBC Count 4.40 - 5.90 10e6/uL 4.67   MCV 78 - 100 fL 97   MCH 26.5 - 33.0 pg 31.9   MCHC 31.5 - 36.5 g/dL 32.9   RDW 10.0 - 15.0 % 12.7   % Neutrophils % 63   % Lymphocytes % 13   % Monocytes % 9   % Eosinophils % 14   % Basophils % 0   Absolute Basophils 0.0 - 0.2 10e3/uL 0.0   Absolute Eosinophils 0.0 - 0.7 10e3/uL 1.9 (H)   Absolute Immature Granulocytes <=0.4 10e3/uL 0.1   Absolute Lymphocytes 0.8 - 5.3 10e3/uL 1.8   Absolute Monocytes 0.0 - 1.3 10e3/uL 1.2   % Immature Granulocytes % 1   Absolute Neutrophils 1.6 - 8.3 10e3/uL 8.3   Absolute NRBCs 10e3/uL 0.0   NRBCs per 100 WBC <1 /100 0   (H): Data is abnormally high  (L): Data is abnormally low    Past Medical History:   Diagnosis Date    ASD (atrial septal defect), sinus venosus defect     Stroke (H)     thalamic    Thoracic or lumbosacral neuritis or radiculitis, unspecified 2007     Family History   Problem Relation Age of Onset    Alzheimer Disease Mother          at 72yoa    Diabetes Father     Diabetes Paternal Grandmother     Cancer Maternal Aunt          of unknown type of cancer    C.A.D. No family hx of     Hypertension No family hx of     Cerebrovascular Disease No family hx of     Colon Cancer No family hx of      Past Surgical History:   Procedure Laterality Date    COLONOSCOPY  3/04    good    COLONOSCOPY N/A 2018    Procedure: COLONOSCOPY;  Colonoscopy;  Surgeon: Gregory Santos MD;  Location: [facility] GI    ENDOSCOPIC RETROGRADE CHOLANGIOPANCREATOGRAM  2012    Procedure: COMBINED ENDOSCOPIC  RETROGRADE CHOLANGIOPANCREATOGRAPHY, SPHINCTEROTOMY;  ercp;  Surgeon: Keri Sandoval MD;  Location:  GI    ENDOSCOPIC RETROGRADE CHOLANGIOPANCREATOGRAM  12/17/2012    Procedure: COMBINED ENDOSCOPIC RETROGRADE CHOLANGIOPANCREATOGRAPHY, REMOVE STONE/BALLOON;;  Surgeon: Keri Sandoval MD;  Location:  GI    HC PRESSURE GRADIENT MEASUREMENT, INITIAL VESSEL  2004    good    HC REMOVAL OF TONSILS,<11 Y/O  1948    Tonsils <11y.o.    LAPAROSCOPIC CHOLECYSTECTOMY WITH CHOLANGIOGRAMS  12/18/2012    Procedure: LAPAROSCOPIC CHOLECYSTECTOMY WITH CHOLANGIOGRAMS;  Laparoscopic cholecystectomy with grams;  Surgeon: Sharda Reilly DO;  Location: [facility] OR    ZZC NONSPECIFIC PROCEDURE  1997    right inguinal hernia repair     ZZC REPR ASD W BYPASS  2004       ENVIRONMENTAL HISTORY:   Pets inside the house include None.  Do you smoke cigarettes or other recreational drugs? No There is/are 0 smokers living in the house. The house does not have a damp basement.     SOCIAL HISTORY:   Tommie is retired, previously employed as [employer] employee. He lives with his spouse.      Review of Systems      Current Outpatient Medications:     acetaminophen (TYLENOL) 650 MG CR tablet, Take 650 mg by mouth every 8 hours as needed, Disp: , Rfl:     atorvastatin (LIPITOR) 10 MG tablet, TAKE 1 TABLET (10 MG) BY MOUTH DAILY., Disp: 90 tablet, Rfl: 3    famotidine (PEPCID) 20 MG tablet, Take 1 tablet (20 mg) by mouth daily., Disp: 30 tablet, Rfl: 0    fexofenadine (ALLEGRA) 180 MG tablet, Take 1 tablet (180 mg) by mouth daily., Disp: 30 tablet, Rfl: 0    gabapentin (NEURONTIN) 100 MG capsule, Take 1 capsule (100 mg) by mouth 2 times daily., Disp: 180 capsule, Rfl: 1    gabapentin (NEURONTIN) 300 MG capsule, Take 1 capsule (300 mg) by mouth at bedtime., Disp: 90 capsule, Rfl: 2    latanoprost (XALATAN) 0.005 % ophthalmic solution, Place 1 drop into both eyes daily, Disp: , Rfl:     losartan (COZAAR) 50 MG tablet, Take 1 tablet (50 mg)  by mouth daily., Disp: 90 tablet, Rfl: 2    predniSONE (DELTASONE) 20 MG tablet, 60 mg for 4 days , 40 mg for 3 days , 20 mg for 3 days , 10 mg for 3 days, Disp: 25 tablet, Rfl: 0    predniSONE (DELTASONE) 20 MG tablet, Take 3 tabs by mouth daily x 2 days, then 2 tabs daily x 3 days, then 1 tab daily x 3 days, then 1/2 tab daily x 3 days., Disp: 18 tablet, Rfl: 0    sildenafil (VIAGRA) 100 MG tablet, Take 0.5-1 tablets ( mg) by mouth daily as needed. 30 minutes to 4 hours prior to sexual activity, Disp: 30 tablet, Rfl: 1    triamcinolone (KENALOG) 0.1 % external ointment, Apply topically 2 times daily for 10 days. Do not apply to face or skin folds., Disp: 30 g, Rfl: 0  Allergies   Allergen Reactions    Penicillins Swelling     SWELLING OF ARM         EXAM:   BP (!) 150/83 (BP Location: Left arm, Patient Position: Sitting, Cuff Size: Adult Regular)   Pulse 81   Resp 20   Wt 64.5 kg (142 lb 4.8 oz)   SpO2 97%   BMI 23.68 kg/m      Physical Exam    Constitutional:       General: He is not in acute distress.     Appearance: Normal appearance. He is not ill-appearing.   HENT:      Head: Normocephalic and atraumatic.      Nose: Nose normal. No congestion or rhinorrhea.      Mouth/Throat:      Mouth: Mucous membranes are moist.      Pharynx: Oropharynx is clear. No posterior oropharyngeal erythema.   Eyes:      General:         Right eye: No discharge.         Left eye: No discharge.   Cardiovascular:      Rate and Rhythm: Normal rate and regular rhythm.      Heart sounds: Normal heart sounds.   Pulmonary:      Effort: Pulmonary effort is normal.      Breath sounds: Normal breath sounds. No wheezing or rhonchi.   Skin:     General: Skin is warm.      Findings: He has erythema of his forearms and hands as well as torso and upper legs.  There is some purplish discoloration of his hands and thighs.  He does have some scaling on bilateral hands.  No blisters are evident.  No mouth sores.  Neurological:       General: No focal deficit present.      Mental Status: He is alert. Mental status is at baseline.   Psychiatric:         Mood and Affect: Mood normal.         Behavior: Behavior normal.        ASSESSMENT/PLAN:  Tommie Kendrick is a 79 year old male seen today for a rash.  He does not have hives.  Unknown cause.  His eosinophils are significantly elevated.  Will repeat a CBC and differential while he is taking prednisone.  He should have been on prednisone with the last lab and it would be unusual to have an elevated eosinophil count while on steroids.  If that is still elevated will have to consider additional evaluation.    Will modify medications as below.    Stop Fexofenadine  Pepcid 20 mg daily to continue  Continue Prednisone  Start Zyrtec (Cetirizine) 20 mg twice daily   Check labs    Follow-up to be determined.      Thank you for allowing me to participate in the care of Tommie Kendrick.      I spent 35 minutes on the date of the encounter doing chart review, history and exam, documentation and further coordination as noted above exclusive of separately reported interpretations    Kev Jones MD  Allergy/Immunology  [facility]

## 2025-01-27 NOTE — LETTER
1/27/2025      Tommie Kendrick  [address] [facility] [id number]      Dear Colleague,    Thank you for referring your patient, Tommie Kendrick, to the [facility]. Please see a copy of my visit note below.    Tommie Kendrick was seen in the Allergy Clinic at [facility].    Tommie Kendrick is a 79 year old male being seen today at the request of Deyanira Bowman MD, [facility] in consultation for a rash.    Chris is here today for evaluation of a rash and concern for the cause being MSG.  He had Chinese food 5 times within about a week and he is wondering if that might of triggered the rash.  There is no other variables that are concerning.  There were no new medications for at least a year, recent vaccinations, or recent illnesses.  His medications have been stable.  He was seen on January 18, January 21, and January 24.  He was seen in the emergency department on the 24th.  The itching was severe.  Blood tests on the 24th reveal an eosinophil count of 1900.  He was likely on prednisone when the lab was ordered.  Prednisone was ordered both on the 18th and the 21st.  He was initially taking 60 mg daily and tapered down, and the taper changed on the 21st.  He is currently taking 40 mg of prednisone.  Triamcinolone was prescribed by the emergency department and he is not sure if this provided much benefit.  Additional labs included a complete metabolic panel and his creatinine was mildly elevated which has been elevated in the past.    The rash has been present for 2 weeks.  It started on his inner thighs and progressed to his arms and torso.  The most prominent areas right now is his hands and forearms.  It is gradually improving.  However the itch has been quite problematic and is what is driven him to seek medical attention on several occasions as described.  His wife is not itching.     Latest Reference Range & Units 01/24/25  09:53   Sodium 135 - 145 mmol/L 140   Potassium 3.4 - 5.3 mmol/L 4.0   Chloride 98 - 107 mmol/L 102   Carbon Dioxide (CO2) 22 - 29 mmol/L 29   Urea Nitrogen 8.0 - 23.0 mg/dL 28.1 (H)   Creatinine 0.67 - 1.17 mg/dL 1.49 (H)   GFR Estimate >60 mL/min/1.73m2 47 (L)   Calcium 8.8 - 10.4 mg/dL 9.9   Anion Gap 7 - 15 mmol/L 9   Albumin 3.5 - 5.2 g/dL 3.8   Protein Total 6.4 - 8.3 g/dL 6.2 (L)   Alkaline Phosphatase 40 - 150 U/L 80   ALT 0 - 70 U/L 48   AST 0 - 45 U/L 30   Bilirubin Total <=1.2 mg/dL 0.8   Glucose 70 - 99 mg/dL 102 (H)   WBC 4.0 - 11.0 10e3/uL 13.2 (H)   Hemoglobin 13.3 - 17.7 g/dL 14.9   Hematocrit 40.0 - 53.0 % 45.3   Platelet Count 150 - 450 10e3/uL 322   RBC Count 4.40 - 5.90 10e6/uL 4.67   MCV 78 - 100 fL 97   MCH 26.5 - 33.0 pg 31.9   MCHC 31.5 - 36.5 g/dL 32.9   RDW 10.0 - 15.0 % 12.7   % Neutrophils % 63   % Lymphocytes % 13   % Monocytes % 9   % Eosinophils % 14   % Basophils % 0   Absolute Basophils 0.0 - 0.2 10e3/uL 0.0   Absolute Eosinophils 0.0 - 0.7 10e3/uL 1.9 (H)   Absolute Immature Granulocytes <=0.4 10e3/uL 0.1   Absolute Lymphocytes 0.8 - 5.3 10e3/uL 1.8   Absolute Monocytes 0.0 - 1.3 10e3/uL 1.2   % Immature Granulocytes % 1   Absolute Neutrophils 1.6 - 8.3 10e3/uL 8.3   Absolute NRBCs 10e3/uL 0.0   NRBCs per 100 WBC <1 /100 0   (H): Data is abnormally high  (L): Data is abnormally low    Past Medical History:   Diagnosis Date     ASD (atrial septal defect), sinus venosus defect      Stroke (H)     thalamic     Thoracic or lumbosacral neuritis or radiculitis, unspecified 2007     Family History   Problem Relation Age of Onset     Alzheimer Disease Mother          at 72yoa     Diabetes Father      Diabetes Paternal Grandmother      Cancer Maternal Aunt          of unknown type of cancer     C.A.D. No family hx of      Hypertension No family hx of      Cerebrovascular Disease No family hx of      Colon Cancer No family hx of      Past Surgical History:   Procedure Laterality  Date     COLONOSCOPY  3/04    good     COLONOSCOPY N/A 4/12/2018    Procedure: COLONOSCOPY;  Colonoscopy;  Surgeon: Gregory Santos MD;  Location:  GI     ENDOSCOPIC RETROGRADE CHOLANGIOPANCREATOGRAM  12/17/2012    Procedure: COMBINED ENDOSCOPIC RETROGRADE CHOLANGIOPANCREATOGRAPHY, SPHINCTEROTOMY;  ercp;  Surgeon: Keri Sandoval MD;  Location:  GI     ENDOSCOPIC RETROGRADE CHOLANGIOPANCREATOGRAM  12/17/2012    Procedure: COMBINED ENDOSCOPIC RETROGRADE CHOLANGIOPANCREATOGRAPHY, REMOVE STONE/BALLOON;;  Surgeon: Keri Sandoval MD;  Location:  GI     HC PRESSURE GRADIENT MEASUREMENT, INITIAL VESSEL  2004    good     HC REMOVAL OF TONSILS,<13 Y/O  1948    Tonsils <11y.o.     LAPAROSCOPIC CHOLECYSTECTOMY WITH CHOLANGIOGRAMS  12/18/2012    Procedure: LAPAROSCOPIC CHOLECYSTECTOMY WITH CHOLANGIOGRAMS;  Laparoscopic cholecystectomy with grams;  Surgeon: Sharda Reilly DO;  Location: [facility] OR     ZZC NONSPECIFIC PROCEDURE  1997    right inguinal hernia repair      ZZC REPR ASD W BYPASS  2004       ENVIRONMENTAL HISTORY:   Pets inside the house include None.  Do you smoke cigarettes or other recreational drugs? No There is/are 0 smokers living in the house. The house does not have a damp basement.     SOCIAL HISTORY:   Tommie is retired, previously employed as [employer] employee. He lives with his spouse.      Review of Systems      Current Outpatient Medications:      acetaminophen (TYLENOL) 650 MG CR tablet, Take 650 mg by mouth every 8 hours as needed, Disp: , Rfl:      atorvastatin (LIPITOR) 10 MG tablet, TAKE 1 TABLET (10 MG) BY MOUTH DAILY., Disp: 90 tablet, Rfl: 3     famotidine (PEPCID) 20 MG tablet, Take 1 tablet (20 mg) by mouth daily., Disp: 30 tablet, Rfl: 0     fexofenadine (ALLEGRA) 180 MG tablet, Take 1 tablet (180 mg) by mouth daily., Disp: 30 tablet, Rfl: 0     gabapentin (NEURONTIN) 100 MG capsule, Take 1 capsule (100 mg) by mouth 2 times daily., Disp: 180 capsule, Rfl: 1      gabapentin (NEURONTIN) 300 MG capsule, Take 1 capsule (300 mg) by mouth at bedtime., Disp: 90 capsule, Rfl: 2     latanoprost (XALATAN) 0.005 % ophthalmic solution, Place 1 drop into both eyes daily, Disp: , Rfl:      losartan (COZAAR) 50 MG tablet, Take 1 tablet (50 mg) by mouth daily., Disp: 90 tablet, Rfl: 2     predniSONE (DELTASONE) 20 MG tablet, 60 mg for 4 days , 40 mg for 3 days , 20 mg for 3 days , 10 mg for 3 days, Disp: 25 tablet, Rfl: 0     predniSONE (DELTASONE) 20 MG tablet, Take 3 tabs by mouth daily x 2 days, then 2 tabs daily x 3 days, then 1 tab daily x 3 days, then 1/2 tab daily x 3 days., Disp: 18 tablet, Rfl: 0     sildenafil (VIAGRA) 100 MG tablet, Take 0.5-1 tablets ( mg) by mouth daily as needed. 30 minutes to 4 hours prior to sexual activity, Disp: 30 tablet, Rfl: 1     triamcinolone (KENALOG) 0.1 % external ointment, Apply topically 2 times daily for 10 days. Do not apply to face or skin folds., Disp: 30 g, Rfl: 0  Allergies   Allergen Reactions     Penicillins Swelling     SWELLING OF ARM         EXAM:   BP (!) 150/83 (BP Location: Left arm, Patient Position: Sitting, Cuff Size: Adult Regular)   Pulse 81   Resp 20   Wt 64.5 kg (142 lb 4.8 oz)   SpO2 97%   BMI 23.68 kg/m      Physical Exam    Constitutional:       General: He is not in acute distress.     Appearance: Normal appearance. He is not ill-appearing.   HENT:      Head: Normocephalic and atraumatic.      Nose: Nose normal. No congestion or rhinorrhea.      Mouth/Throat:      Mouth: Mucous membranes are moist.      Pharynx: Oropharynx is clear. No posterior oropharyngeal erythema.   Eyes:      General:         Right eye: No discharge.         Left eye: No discharge.   Cardiovascular:      Rate and Rhythm: Normal rate and regular rhythm.      Heart sounds: Normal heart sounds.   Pulmonary:      Effort: Pulmonary effort is normal.      Breath sounds: Normal breath sounds. No wheezing or rhonchi.   Skin:     General: Skin is  warm.      Findings: He has erythema of his forearms and hands as well as torso and upper legs.  There is some purplish discoloration of his hands and thighs.  He does have some scaling on bilateral hands.  No blisters are evident.  No mouth sores.  Neurological:      General: No focal deficit present.      Mental Status: He is alert. Mental status is at baseline.   Psychiatric:         Mood and Affect: Mood normal.         Behavior: Behavior normal.        ASSESSMENT/PLAN:  Tommie Kendrick is a 79 year old male seen today for a rash.  He does not have hives.  Unknown cause.  His eosinophils are significantly elevated.  Will repeat a CBC and differential while he is taking prednisone.  He should have been on prednisone with the last lab and it would be unusual to have an elevated eosinophil count while on steroids.  If that is still elevated will have to consider additional evaluation.    Will modify medications as below.    Stop Fexofenadine  Pepcid 20 mg daily to continue  Continue Prednisone  Start Zyrtec (Cetirizine) 20 mg twice daily   Check labs    Follow-up to be determined.      Thank you for allowing me to participate in the care of Tommie Kendrick.      I spent 35 minutes on the date of the encounter doing chart review, history and exam, documentation and further coordination as noted above exclusive of separately reported interpretations    Kev Jones MD  Allergy/Immunology  [facility]         Again, thank you for allowing me to participate in the care of your patient.        Sincerely,        Kev Jones MD    Electronically signed

## 2025-01-28 LAB
CK SERPL-CCNC: 46 U/L (ref 39–308)
CRP SERPL-MCNC: <3 MG/L

## 2025-01-28 NOTE — TELEPHONE ENCOUNTER
Called and spoke with pt. He saw allergy yesterday and will follow their recommendations. He will follow up with us in the future if he thinks he needs to see us.    Analia VILLEDA RN  Dermatology   [phone]

## 2025-01-29 DIAGNOSIS — I63.81 ACUTE THALAMIC INFARCTION (H): ICD-10-CM

## 2025-01-29 RX ORDER — ATORVASTATIN CALCIUM 10 MG/1
10 TABLET, FILM COATED ORAL DAILY
Qty: 90 TABLET | Refills: 1 | Status: SHIPPED | OUTPATIENT
Start: 2025-01-29

## 2025-03-12 ENCOUNTER — MYC MEDICAL ADVICE (OUTPATIENT)
Dept: FAMILY MEDICINE | Facility: CLINIC | Age: 80
End: 2025-03-12
Payer: COMMERCIAL

## 2025-03-12 NOTE — TELEPHONE ENCOUNTER
Routing to Randy.     Please see pt's MCM and advise. VV to discuss further?     FRANCISCO FlwoerN, RN     [facility]    03/12/2025 at 1:57 PM

## 2025-03-13 NOTE — TELEPHONE ENCOUNTER
Was not able to LVM message as the Pt does not have a VM set up.     Two more attempts will be made.     Vonnie Ho   [facility]

## 2025-03-14 NOTE — TELEPHONE ENCOUNTER
Was not able to LVM message as the Pt does not have a VM set up.      One more attempt will be made.     Vonnie Ho   [facility]

## 2025-04-02 ENCOUNTER — TELEPHONE (OUTPATIENT)
Dept: FAMILY MEDICINE | Facility: CLINIC | Age: 80
End: 2025-04-02
Payer: COMMERCIAL

## 2025-04-02 NOTE — TELEPHONE ENCOUNTER
Patient Quality Outreach    Patient is due for the following:   Hypertension -  Hypertension follow-up visit    Action(s) Taken:   Schedule a office visit for HTN    Type of outreach:    Sent [employer] message.    Questions for provider review:    None         Pam Winter  Chart routed to None.

## 2025-05-03 ENCOUNTER — HEALTH MAINTENANCE LETTER (OUTPATIENT)
Age: 80
End: 2025-05-03

## 2025-07-27 DIAGNOSIS — I63.81 ACUTE THALAMIC INFARCTION (H): ICD-10-CM

## 2025-07-28 RX ORDER — ATORVASTATIN CALCIUM 10 MG/1
10 TABLET, FILM COATED ORAL DAILY
Qty: 90 TABLET | Refills: 0 | Status: SHIPPED | OUTPATIENT
Start: 2025-07-28

## (undated) DEVICE — KIT ENDO TURNOVER/PROCEDURE W/CLEAN A SCOPE LINERS 103888

## (undated) RX ORDER — FENTANYL CITRATE 50 UG/ML
INJECTION, SOLUTION INTRAMUSCULAR; INTRAVENOUS
Status: DISPENSED
Start: 2018-04-12